# Patient Record
Sex: MALE | Race: BLACK OR AFRICAN AMERICAN | Employment: FULL TIME | URBAN - METROPOLITAN AREA
[De-identification: names, ages, dates, MRNs, and addresses within clinical notes are randomized per-mention and may not be internally consistent; named-entity substitution may affect disease eponyms.]

---

## 2017-05-31 ENCOUNTER — OFFICE VISIT (OUTPATIENT)
Dept: FAMILY MEDICINE CLINIC | Age: 42
End: 2017-05-31

## 2017-05-31 VITALS
WEIGHT: 295.4 LBS | BODY MASS INDEX: 40.01 KG/M2 | HEIGHT: 72 IN | SYSTOLIC BLOOD PRESSURE: 128 MMHG | DIASTOLIC BLOOD PRESSURE: 88 MMHG | HEART RATE: 80 BPM

## 2017-05-31 DIAGNOSIS — N52.8 OTHER MALE ERECTILE DYSFUNCTION: ICD-10-CM

## 2017-05-31 DIAGNOSIS — I10 ESSENTIAL HYPERTENSION: Primary | ICD-10-CM

## 2017-05-31 DIAGNOSIS — E66.01 OBESITY, CLASS III, BMI 40-49.9 (MORBID OBESITY) (HCC): ICD-10-CM

## 2017-05-31 DIAGNOSIS — R07.89 MUSCULAR CHEST PAIN: ICD-10-CM

## 2017-05-31 DIAGNOSIS — E78.1 HIGH TRIGLYCERIDES: ICD-10-CM

## 2017-05-31 LAB
CHOLESTEROL, TOTAL: 164 MG/DL (ref 0–199)
HDLC SERPL-MCNC: 34 MG/DL (ref 40–60)
LDL CHOLESTEROL CALCULATED: ABNORMAL MG/DL
LDL CHOLESTEROL DIRECT: 60 MG/DL
TRIGL SERPL-MCNC: 503 MG/DL (ref 0–150)
VLDLC SERPL CALC-MCNC: ABNORMAL MG/DL

## 2017-05-31 PROCEDURE — 36415 COLL VENOUS BLD VENIPUNCTURE: CPT | Performed by: FAMILY MEDICINE

## 2017-05-31 PROCEDURE — 99214 OFFICE O/P EST MOD 30 MIN: CPT | Performed by: FAMILY MEDICINE

## 2017-05-31 RX ORDER — MULTIVIT WITH MINERALS/LUTEIN
1000 TABLET ORAL DAILY
COMMUNITY
End: 2018-08-02

## 2017-05-31 RX ORDER — AMLODIPINE BESYLATE 10 MG/1
10 TABLET ORAL DAILY
Qty: 30 TABLET | Refills: 5 | Status: SHIPPED | OUTPATIENT
Start: 2017-05-31 | End: 2017-07-13 | Stop reason: SDUPTHER

## 2017-05-31 ASSESSMENT — ENCOUNTER SYMPTOMS: SHORTNESS OF BREATH: 0

## 2017-06-01 DIAGNOSIS — E78.1 HIGH TRIGLYCERIDES: Primary | ICD-10-CM

## 2017-06-01 RX ORDER — ATORVASTATIN CALCIUM 20 MG/1
20 TABLET, FILM COATED ORAL DAILY
Qty: 30 TABLET | Refills: 1 | Status: SHIPPED | OUTPATIENT
Start: 2017-06-01 | End: 2017-07-13 | Stop reason: SDUPTHER

## 2017-07-11 ENCOUNTER — HOSPITAL ENCOUNTER (OUTPATIENT)
Dept: GENERAL RADIOLOGY | Age: 42
Discharge: HOME | End: 2017-07-11
Attending: FAMILY MEDICINE | Admitting: FAMILY MEDICINE

## 2017-07-11 LAB
ALT SERPL-CCNC: 42 U/L (ref 10–40)
AST SERPL-CCNC: 25 IU/L (ref 15–37)
CHOLESTEROL: 130 MG/DL
HDLC SERPL-MCNC: 39 MG/DL
LDL CHOLESTEROL CALCULATED: 28 MG/DL
TRIGL SERPL-MCNC: 313 MG/DL

## 2017-07-13 ENCOUNTER — OFFICE VISIT (OUTPATIENT)
Dept: FAMILY MEDICINE CLINIC | Age: 42
End: 2017-07-13

## 2017-07-13 VITALS
WEIGHT: 301.4 LBS | DIASTOLIC BLOOD PRESSURE: 78 MMHG | HEART RATE: 78 BPM | BODY MASS INDEX: 40.82 KG/M2 | HEIGHT: 72 IN | SYSTOLIC BLOOD PRESSURE: 120 MMHG

## 2017-07-13 DIAGNOSIS — R60.0 PEDAL EDEMA: Primary | ICD-10-CM

## 2017-07-13 DIAGNOSIS — E78.1 HIGH TRIGLYCERIDES: ICD-10-CM

## 2017-07-13 DIAGNOSIS — I10 ESSENTIAL HYPERTENSION: ICD-10-CM

## 2017-07-13 PROCEDURE — 99213 OFFICE O/P EST LOW 20 MIN: CPT | Performed by: FAMILY MEDICINE

## 2017-07-13 RX ORDER — ATORVASTATIN CALCIUM 20 MG/1
20 TABLET, FILM COATED ORAL DAILY
Qty: 30 TABLET | Refills: 5 | Status: SHIPPED | OUTPATIENT
Start: 2017-07-13 | End: 2018-01-18 | Stop reason: DRUGHIGH

## 2017-07-13 RX ORDER — AMLODIPINE BESYLATE 10 MG/1
10 TABLET ORAL DAILY
Qty: 30 TABLET | Refills: 5 | Status: SHIPPED | OUTPATIENT
Start: 2017-07-13 | End: 2018-07-12

## 2017-07-13 ASSESSMENT — ENCOUNTER SYMPTOMS
SHORTNESS OF BREATH: 0
CHEST TIGHTNESS: 0

## 2018-01-16 ENCOUNTER — HOSPITAL ENCOUNTER (OUTPATIENT)
Dept: GENERAL RADIOLOGY | Age: 43
Discharge: HOME | End: 2018-01-16
Attending: FAMILY MEDICINE | Admitting: FAMILY MEDICINE

## 2018-01-16 LAB
ALBUMIN SERPL-MCNC: 4.9 GM/DL (ref 3.4–5)
ALP BLD-CCNC: 99 IU/L (ref 40–128)
ALT SERPL-CCNC: 37 U/L (ref 10–40)
ANION GAP SERPL CALCULATED.3IONS-SCNC: 13 MMOL/L (ref 4–16)
AST SERPL-CCNC: 28 IU/L (ref 15–37)
BILIRUB SERPL-MCNC: 1.2 MG/DL (ref 0–1)
BUN BLDV-MCNC: 14 MG/DL (ref 6–23)
CALCIUM SERPL-MCNC: 9.7 MG/DL (ref 8.3–10.6)
CHLORIDE BLD-SCNC: 101 MMOL/L (ref 99–110)
CHOLESTEROL: 127 MG/DL
CO2: 27 MMOL/L (ref 21–32)
CREAT SERPL-MCNC: 1.1 MG/DL (ref 0.9–1.3)
GFR AFRICAN AMERICAN: >60 ML/MIN/1.73M2
GFR NON-AFRICAN AMERICAN: >60 ML/MIN/1.73M2
GLUCOSE BLD-MCNC: 97 MG/DL (ref 70–99)
HDLC SERPL-MCNC: 39 MG/DL
LDL CHOLESTEROL CALCULATED: 31 MG/DL
POTASSIUM SERPL-SCNC: 4.2 MMOL/L (ref 3.5–5.1)
SODIUM BLD-SCNC: 141 MMOL/L (ref 135–145)
TOTAL PROTEIN: 7.2 GM/DL (ref 6.4–8.2)
TRIGL SERPL-MCNC: 283 MG/DL

## 2018-01-18 ENCOUNTER — OFFICE VISIT (OUTPATIENT)
Dept: FAMILY MEDICINE CLINIC | Age: 43
End: 2018-01-18

## 2018-01-18 VITALS
BODY MASS INDEX: 40.61 KG/M2 | RESPIRATION RATE: 14 BRPM | HEIGHT: 72 IN | HEART RATE: 88 BPM | DIASTOLIC BLOOD PRESSURE: 82 MMHG | WEIGHT: 299.8 LBS | SYSTOLIC BLOOD PRESSURE: 122 MMHG | TEMPERATURE: 97.9 F

## 2018-01-18 DIAGNOSIS — I10 ESSENTIAL HYPERTENSION: ICD-10-CM

## 2018-01-18 DIAGNOSIS — E78.1 HIGH TRIGLYCERIDES: ICD-10-CM

## 2018-01-18 DIAGNOSIS — M72.2 PLANTAR FASCIITIS: ICD-10-CM

## 2018-01-18 DIAGNOSIS — J01.00 ACUTE MAXILLARY SINUSITIS, RECURRENCE NOT SPECIFIED: Primary | ICD-10-CM

## 2018-01-18 PROCEDURE — 99214 OFFICE O/P EST MOD 30 MIN: CPT | Performed by: FAMILY MEDICINE

## 2018-01-18 RX ORDER — AMOXICILLIN 875 MG/1
875 TABLET, COATED ORAL 2 TIMES DAILY
Qty: 20 TABLET | Refills: 0 | Status: SHIPPED | OUTPATIENT
Start: 2018-01-18 | End: 2018-01-28

## 2018-01-18 RX ORDER — ATORVASTATIN CALCIUM 40 MG/1
40 TABLET, FILM COATED ORAL DAILY
Qty: 30 TABLET | Refills: 5 | Status: SHIPPED | OUTPATIENT
Start: 2018-01-18 | End: 2018-07-12 | Stop reason: SDUPTHER

## 2018-01-18 ASSESSMENT — ENCOUNTER SYMPTOMS
SINUS PRESSURE: 1
CHEST TIGHTNESS: 0
SHORTNESS OF BREATH: 0

## 2018-01-18 NOTE — PATIENT INSTRUCTIONS
more than 2 weeks. Where can you learn more? Go to [email or web address] and sign in to your [employer] account. Enter L214 in the [facility] box to learn more about \"Plantar Fasciitis: Care Instructions. \"     If you do not have an account, please click on the \"Sign Up Now\" link. Current as of: March 21, 2017  Content Version: 11.5  © 0395-2599 [employer]. Care instructions adapted under license by [facility] ([facility]). If you have questions about a medical condition or this instruction, always ask your healthcare professional. [facility] any warranty or liability for your use of this information. Patient Education        Learning About the Mediterranean Diet  What is the [address]? The Mediterranean diet is a style of eating rather than a diet plan. It features foods eaten in [place], [place], [place] and [place], and other countries along the [facility]. It emphasizes eating foods like fish, fruits, vegetables, beans, high-fiber breads and whole grains, nuts, and olive oil. This style of eating includes limited red meat, cheese, and sweets. Why choose the Mediterranean diet? A Mediterranean-style diet may improve heart health. It contains more fat than other heart-healthy diets. But the fats are mainly from nuts, unsaturated oils (such as fish oils and olive oil), and certain nut or seed oils (such as canola, soybean, or flaxseed oil). These fats may help protect the heart and blood vessels. How can you get started on the Mediterranean diet? Here are some things you can do to switch to a more Mediterranean way of eating. What to eat  · Eat a variety of fruits and vegetables each day, such as grapes, blueberries, tomatoes, broccoli, peppers, figs, olives, spinach, eggplant, beans, lentils, and chickpeas.   · Eat a variety of whole-grain foods each day, such as oats, brown rice, and whole wheat bread, pasta, and

## 2018-01-18 NOTE — PROGRESS NOTES
No components found for: CHLPL  Lab Results   Component Value Date    TRIG 283 (H) 01/16/2018     Lab Results   Component Value Date    HDL 39 (L) 01/16/2018     Lab Results   Component Value Date    LDLCALC 31 01/16/2018     Lab Results   Component Value Date    LABVLDL see below 05/31/2017       Lab Results   Component Value Date    ALT 37 01/16/2018    AST 28 01/16/2018    ALKPHOS 99 01/16/2018    BILITOT 1.2 (H) 01/16/2018           Is patient currently taking any cholesterol medications? yes   If yes, see med list as above    Is the patient reporting any side effects of cholesterol medications? No    Is the patient taking any over the counter medications? Yes   If yes, see med list as above    Is the patient taking a daily aspirin?     Yes

## 2018-07-06 ENCOUNTER — TELEPHONE (OUTPATIENT)
Dept: FAMILY MEDICINE CLINIC | Age: 43
End: 2018-07-06

## 2018-07-06 DIAGNOSIS — I10 ESSENTIAL HYPERTENSION: Primary | ICD-10-CM

## 2018-07-06 RX ORDER — AMLODIPINE BESYLATE 10 MG/1
10 TABLET ORAL DAILY
Qty: 30 TABLET | Refills: 0 | Status: SHIPPED | OUTPATIENT
Start: 2018-07-06 | End: 2018-07-12 | Stop reason: SDUPTHER

## 2018-07-06 NOTE — TELEPHONE ENCOUNTER
Patient requesting refill on Norvac 10mg. Patient has one pill left. Patent next appt is 7/12/2018. [facility] [address].

## 2018-07-10 ENCOUNTER — HOSPITAL ENCOUNTER (OUTPATIENT)
Dept: GENERAL RADIOLOGY | Age: 43
Discharge: HOME | End: 2018-07-10
Attending: FAMILY MEDICINE | Admitting: FAMILY MEDICINE

## 2018-07-10 LAB
ALBUMIN SERPL-MCNC: 4.7 GM/DL (ref 3.4–5)
ALP BLD-CCNC: 102 IU/L (ref 40–128)
ALT SERPL-CCNC: 43 U/L (ref 10–40)
ANION GAP SERPL CALCULATED.3IONS-SCNC: 17 MMOL/L (ref 4–16)
AST SERPL-CCNC: 28 IU/L (ref 15–37)
BILIRUB SERPL-MCNC: 1 MG/DL (ref 0–1)
BUN BLDV-MCNC: 12 MG/DL (ref 6–23)
CALCIUM SERPL-MCNC: 9.3 MG/DL (ref 8.3–10.6)
CHLORIDE BLD-SCNC: 102 MMOL/L (ref 99–110)
CHOLESTEROL: 118 MG/DL
CO2: 24 MMOL/L (ref 21–32)
CREAT SERPL-MCNC: 1 MG/DL (ref 0.9–1.3)
GFR AFRICAN AMERICAN: >60 ML/MIN/1.73M2
GFR NON-AFRICAN AMERICAN: >60 ML/MIN/1.73M2
GLUCOSE BLD-MCNC: 99 MG/DL (ref 70–99)
HDLC SERPL-MCNC: 34 MG/DL
LDL CHOLESTEROL DIRECT: 44 MG/DL
POTASSIUM SERPL-SCNC: 4.4 MMOL/L (ref 3.5–5.1)
SODIUM BLD-SCNC: 143 MMOL/L (ref 135–145)
TOTAL PROTEIN: 7.1 GM/DL (ref 6.4–8.2)
TRIGL SERPL-MCNC: 324 MG/DL

## 2018-07-12 ENCOUNTER — OFFICE VISIT (OUTPATIENT)
Dept: FAMILY MEDICINE CLINIC | Age: 43
End: 2018-07-12

## 2018-07-12 VITALS
HEIGHT: 72 IN | WEIGHT: 303.8 LBS | SYSTOLIC BLOOD PRESSURE: 120 MMHG | DIASTOLIC BLOOD PRESSURE: 70 MMHG | BODY MASS INDEX: 41.15 KG/M2 | HEART RATE: 76 BPM

## 2018-07-12 DIAGNOSIS — Z91.09 ENVIRONMENTAL ALLERGIES: ICD-10-CM

## 2018-07-12 DIAGNOSIS — I10 ESSENTIAL HYPERTENSION: Primary | ICD-10-CM

## 2018-07-12 DIAGNOSIS — R06.83 SNORING: ICD-10-CM

## 2018-07-12 DIAGNOSIS — E78.1 HIGH TRIGLYCERIDES: ICD-10-CM

## 2018-07-12 DIAGNOSIS — E66.01 OBESITY, CLASS III, BMI 40-49.9 (MORBID OBESITY) (HCC): ICD-10-CM

## 2018-07-12 DIAGNOSIS — R10.13 EPIGASTRIC PAIN: ICD-10-CM

## 2018-07-12 PROCEDURE — 99214 OFFICE O/P EST MOD 30 MIN: CPT | Performed by: FAMILY MEDICINE

## 2018-07-12 RX ORDER — AMLODIPINE BESYLATE 10 MG/1
10 TABLET ORAL DAILY
Qty: 30 TABLET | Refills: 5 | Status: SHIPPED | OUTPATIENT
Start: 2018-07-12 | End: 2019-01-03 | Stop reason: SDUPTHER

## 2018-07-12 RX ORDER — FLUTICASONE PROPIONATE 50 MCG
1 SPRAY, SUSPENSION (ML) NASAL DAILY
Qty: 1 BOTTLE | Refills: 3 | Status: SHIPPED | OUTPATIENT
Start: 2018-07-12 | End: 2020-09-10

## 2018-07-12 RX ORDER — ATORVASTATIN CALCIUM 40 MG/1
40 TABLET, FILM COATED ORAL DAILY
Qty: 30 TABLET | Refills: 5 | Status: SHIPPED | OUTPATIENT
Start: 2018-07-12 | End: 2019-01-03 | Stop reason: DRUGHIGH

## 2018-07-12 RX ORDER — RANITIDINE 150 MG/1
150 TABLET ORAL 2 TIMES DAILY
Qty: 60 TABLET | Refills: 0 | Status: SHIPPED | OUTPATIENT
Start: 2018-07-12 | End: 2018-08-02 | Stop reason: SDUPTHER

## 2018-07-12 ASSESSMENT — PATIENT HEALTH QUESTIONNAIRE - PHQ9
SUM OF ALL RESPONSES TO PHQ QUESTIONS 1-9: 0
2. FEELING DOWN, DEPRESSED OR HOPELESS: 0
SUM OF ALL RESPONSES TO PHQ9 QUESTIONS 1 & 2: 0
1. LITTLE INTEREST OR PLEASURE IN DOING THINGS: 0

## 2018-07-12 NOTE — PROGRESS NOTES
Patient ID: Jaye Thomas 1975      Hypertension   This is a chronic problem. The current episode started more than 1 month ago. The problem is controlled. Associated symptoms include shortness of breath. Pertinent negatives include no chest pain or palpitations. Past treatments include calcium channel blockers. The current treatment provides significant improvement. Compliance problems include psychosocial issues. There is no history of heart failure. Hyperlipidemia   This is a chronic problem. The current episode started more than 1 year ago. The problem is uncontrolled. Associated symptoms include shortness of breath. Pertinent negatives include no chest pain. Current antihyperlipidemic treatment includes statins. Compliance problems include adherence to exercise. Shortness of Breath   This is a recurrent problem. The current episode started more than 1 year ago. The problem occurs every several days. The problem has been gradually improving. Associated symptoms include abdominal pain. Pertinent negatives include no chest pain or leg swelling. Exacerbated by: being outdoors. Treatments tried: negative cardiac workup. OTC antihistamines. The treatment provided moderate relief. There is no history of a heart failure. Abdominal Pain   This is a recurrent problem. The problem occurs every several days. The problem has been unchanged. The pain is located in the epigastric region and RUQ. The pain is moderate. The pain is aggravated by eating. Relieved by: time. He has tried nothing for the symptoms. Snoring: admits to snoring. Admits to not going to get his sleep testing done    Review of Systems   Respiratory: Positive for shortness of breath. Negative for chest tightness. Cardiovascular: Negative for chest pain, palpitations and leg swelling. Gastrointestinal: Positive for abdominal pain.        Patient Active Problem List   Diagnosis    Allergic rhinitis    Bilateral varicoceles  Hematuria    Erectile dysfunction    Essential hypertension    Iron deficiency anemia    Obesity, Class III, BMI 40-49.9 (morbid obesity) (HCC)    B12 deficiency    Low testosterone level in male    Muscular chest pain    High triglycerides       Past Medical History:   Diagnosis Date    Allergic rhinitis     Hypertension     Morbid obesity ([address]        No past surgical history on file. Family History   Problem Relation Age of Onset    High Blood Pressure Mother     Diabetes Mother     Lupus Mother          29    Heart Disease Sister     Heart Disease Paternal Grandmother     Stroke Paternal Grandmother     Diabetes Maternal Uncle     High Blood Pressure Maternal Grandmother     Diabetes Maternal Grandmother     Stroke Maternal Grandmother     High Blood Pressure Maternal Grandfather     Diabetes Maternal Cousin        Current Outpatient Prescriptions on File Prior to Visit   Medication Sig Dispense Refill    Multiple Vitamins-Minerals (THERAPEUTIC MULTIVITAMIN-MINERALS) tablet Take 1 tablet by mouth daily With Vit D      Blood Pressure Monitoring (B-D ASSURE BPM/DELUXE ARM CUFF) MISC Use as directed for blood pressure monitoring. 1 each 0    aspirin 81 MG chewable tablet Take 1 tablet by mouth daily 30 tablet 0    vitamin E 1000 UNITS capsule Take 1,000 Units by mouth daily      Cholecalciferol (VITAMIN D PO) Take 1 tablet by mouth daily       No current facility-administered medications on file prior to visit. Objective:   Physical Exam   Constitutional: He appears well-developed and well-nourished. Obese   HENT:   Head: Normocephalic and atraumatic. Small airway   Cardiovascular: Normal rate, regular rhythm and normal heart sounds. Pulmonary/Chest: Breath sounds normal. No respiratory distress. He has no wheezes. He has no rales. Abdominal: There is tenderness in the right upper quadrant and epigastric area.    Psychiatric: He has a normal mood and

## 2018-07-13 ASSESSMENT — ENCOUNTER SYMPTOMS
SHORTNESS OF BREATH: 1
ABDOMINAL PAIN: 1
CHEST TIGHTNESS: 0

## 2018-07-20 ENCOUNTER — HOSPITAL ENCOUNTER (OUTPATIENT)
Dept: ULTRASOUND IMAGING | Age: 43
Discharge: HOME | End: 2018-07-20
Attending: FAMILY MEDICINE | Admitting: FAMILY MEDICINE

## 2018-07-20 DIAGNOSIS — R10.13 EPIGASTRIC PAIN: ICD-10-CM

## 2018-08-02 ENCOUNTER — OFFICE VISIT (OUTPATIENT)
Dept: FAMILY MEDICINE CLINIC | Age: 43
End: 2018-08-02

## 2018-08-02 VITALS
DIASTOLIC BLOOD PRESSURE: 68 MMHG | HEIGHT: 72 IN | WEIGHT: 301.6 LBS | BODY MASS INDEX: 40.85 KG/M2 | SYSTOLIC BLOOD PRESSURE: 110 MMHG | HEART RATE: 81 BPM

## 2018-08-02 DIAGNOSIS — R10.13 EPIGASTRIC PAIN: ICD-10-CM

## 2018-08-02 DIAGNOSIS — K76.0 FATTY LIVER: ICD-10-CM

## 2018-08-02 PROCEDURE — 99213 OFFICE O/P EST LOW 20 MIN: CPT | Performed by: FAMILY MEDICINE

## 2018-08-02 RX ORDER — RANITIDINE 150 MG/1
150 TABLET ORAL 2 TIMES DAILY
Qty: 60 TABLET | Refills: 5 | Status: SHIPPED | OUTPATIENT
Start: 2018-08-02 | End: 2019-03-28 | Stop reason: SDUPTHER

## 2018-08-02 ASSESSMENT — ENCOUNTER SYMPTOMS: ABDOMINAL PAIN: 1

## 2018-08-02 NOTE — PROGRESS NOTES
Patient ID: Tai Lane 1975      Abdominal Pain   This is a new problem. The current episode started 1 to 4 weeks ago. The problem has been resolved. The pain is located in the epigastric region. The pain is moderate. The pain is aggravated by certain positions. He has tried H2 blockers for the symptoms. Review of Systems   Constitutional: Negative for unexpected weight change. Gastrointestinal: Positive for abdominal pain. Patient Active Problem List   Diagnosis    Allergic rhinitis    Bilateral varicoceles    Hematuria    Erectile dysfunction    Essential hypertension    Iron deficiency anemia    Obesity, Class III, BMI 40-49.9 (morbid obesity) ([address]    B12 deficiency    Low testosterone level in male    Muscular chest pain    High triglycerides       Past Medical History:   Diagnosis Date    Allergic rhinitis     Hypertension     Morbid obesity ([address]        No past surgical history on file.     Family History   Problem Relation Age of Onset    High Blood Pressure Mother     Diabetes Mother     Lupus Mother          29    Heart Disease Sister     Heart Disease Paternal Grandmother     Stroke Paternal Grandmother     Diabetes Maternal Uncle     High Blood Pressure Maternal Grandmother     Diabetes Maternal Grandmother     Stroke Maternal Grandmother     High Blood Pressure Maternal Grandfather     Diabetes Maternal Cousin        Current Outpatient Prescriptions on File Prior to Visit   Medication Sig Dispense Refill    fluticasone (FLONASE) 50 MCG/ACT nasal spray 1 spray by Nasal route daily 1 Bottle 3    ranitidine (ZANTAC) 150 MG tablet Take 1 tablet by mouth 2 times daily 60 tablet 0    amLODIPine (NORVASC) 10 MG tablet Take 1 tablet by mouth daily 30 tablet 5    atorvastatin (LIPITOR) 40 MG tablet Take 1 tablet by mouth daily 30 tablet 5    Multiple Vitamins-Minerals (THERAPEUTIC MULTIVITAMIN-MINERALS) tablet Take 1 tablet by mouth daily With Vit

## 2018-10-11 ENCOUNTER — TELEPHONE (OUTPATIENT)
Dept: SLEEP CENTER | Age: 43
End: 2018-10-11

## 2019-01-02 ENCOUNTER — HOSPITAL ENCOUNTER (OUTPATIENT)
Age: 44
Discharge: HOME | End: 2019-01-02
Payer: COMMERCIAL

## 2019-01-02 DIAGNOSIS — E78.1 HIGH TRIGLYCERIDES: ICD-10-CM

## 2019-01-02 LAB
ALBUMIN SERPL-MCNC: 4.7 GM/DL (ref 3.4–5)
ALP BLD-CCNC: 91 IU/L (ref 40–128)
ALT SERPL-CCNC: 43 U/L (ref 10–40)
ANION GAP SERPL CALCULATED.3IONS-SCNC: 14 MMOL/L (ref 4–16)
AST SERPL-CCNC: 23 IU/L (ref 15–37)
BILIRUB SERPL-MCNC: 0.8 MG/DL (ref 0–1)
BUN BLDV-MCNC: 12 MG/DL (ref 6–23)
CALCIUM SERPL-MCNC: 9.5 MG/DL (ref 8.3–10.6)
CHLORIDE BLD-SCNC: 99 MMOL/L (ref 99–110)
CHOLESTEROL: 149 MG/DL
CO2: 26 MMOL/L (ref 21–32)
CREAT SERPL-MCNC: 1.1 MG/DL (ref 0.9–1.3)
GFR AFRICAN AMERICAN: >60 ML/MIN/1.73M2
GFR NON-AFRICAN AMERICAN: >60 ML/MIN/1.73M2
GLUCOSE BLD-MCNC: 99 MG/DL (ref 70–99)
HDLC SERPL-MCNC: 34 MG/DL
LDL CHOLESTEROL DIRECT: 49 MG/DL
POTASSIUM SERPL-SCNC: 4.4 MMOL/L (ref 3.5–5.1)
SODIUM BLD-SCNC: 139 MMOL/L (ref 135–145)
TOTAL PROTEIN: 7.2 GM/DL (ref 6.4–8.2)
TRIGL SERPL-MCNC: 490 MG/DL

## 2019-01-02 PROCEDURE — 80061 LIPID PANEL: CPT

## 2019-01-02 PROCEDURE — 80053 COMPREHEN METABOLIC PANEL: CPT

## 2019-01-02 PROCEDURE — 83721 ASSAY OF BLOOD LIPOPROTEIN: CPT

## 2019-01-02 PROCEDURE — 36415 COLL VENOUS BLD VENIPUNCTURE: CPT

## 2019-01-03 ENCOUNTER — OFFICE VISIT (OUTPATIENT)
Dept: FAMILY MEDICINE CLINIC | Age: 44
End: 2019-01-03
Payer: COMMERCIAL

## 2019-01-03 VITALS
HEIGHT: 72 IN | BODY MASS INDEX: 41.07 KG/M2 | TEMPERATURE: 97.3 F | SYSTOLIC BLOOD PRESSURE: 137 MMHG | HEART RATE: 73 BPM | WEIGHT: 303.2 LBS | DIASTOLIC BLOOD PRESSURE: 81 MMHG

## 2019-01-03 DIAGNOSIS — R79.89 ABNORMAL LFTS: ICD-10-CM

## 2019-01-03 DIAGNOSIS — I10 ESSENTIAL HYPERTENSION: ICD-10-CM

## 2019-01-03 DIAGNOSIS — K76.0 FATTY LIVER: ICD-10-CM

## 2019-01-03 DIAGNOSIS — F10.10 ALCOHOL ABUSE: ICD-10-CM

## 2019-01-03 DIAGNOSIS — E78.1 HIGH TRIGLYCERIDES: Primary | ICD-10-CM

## 2019-01-03 DIAGNOSIS — J06.9 VIRAL UPPER RESPIRATORY TRACT INFECTION: ICD-10-CM

## 2019-01-03 PROCEDURE — 99214 OFFICE O/P EST MOD 30 MIN: CPT | Performed by: FAMILY MEDICINE

## 2019-01-03 RX ORDER — AMLODIPINE BESYLATE 10 MG/1
10 TABLET ORAL DAILY
Qty: 30 TABLET | Refills: 2 | Status: SHIPPED | OUTPATIENT
Start: 2019-01-03 | End: 2019-03-28 | Stop reason: SDUPTHER

## 2019-01-03 RX ORDER — ATORVASTATIN CALCIUM 80 MG/1
80 TABLET, FILM COATED ORAL DAILY
Qty: 30 TABLET | Refills: 2 | Status: SHIPPED | OUTPATIENT
Start: 2019-01-03 | End: 2019-03-28 | Stop reason: SDUPTHER

## 2019-01-03 ASSESSMENT — ENCOUNTER SYMPTOMS
COUGH: 1
SHORTNESS OF BREATH: 0
CHEST TIGHTNESS: 0

## 2019-03-26 ENCOUNTER — HOSPITAL ENCOUNTER (OUTPATIENT)
Age: 44
Discharge: HOME | End: 2019-03-26
Payer: COMMERCIAL

## 2019-03-26 ENCOUNTER — OFFICE VISIT (OUTPATIENT)
Dept: FAMILY MEDICINE CLINIC | Age: 44
End: 2019-03-26
Payer: COMMERCIAL

## 2019-03-26 VITALS
OXYGEN SATURATION: 99 % | HEART RATE: 70 BPM | WEIGHT: 305.6 LBS | HEIGHT: 72 IN | SYSTOLIC BLOOD PRESSURE: 100 MMHG | BODY MASS INDEX: 41.39 KG/M2 | DIASTOLIC BLOOD PRESSURE: 68 MMHG | TEMPERATURE: 97.9 F

## 2019-03-26 DIAGNOSIS — J02.9 SORE THROAT: ICD-10-CM

## 2019-03-26 DIAGNOSIS — J06.9 VIRAL URI: Primary | ICD-10-CM

## 2019-03-26 LAB
ALT SERPL-CCNC: 38 U/L (ref 10–40)
AST SERPL-CCNC: 25 IU/L (ref 15–37)
CHOLESTEROL: 96 MG/DL
HDLC SERPL-MCNC: 36 MG/DL
LDL CHOLESTEROL DIRECT: 46 MG/DL
STREPTOCOCCUS A RNA: NEGATIVE
TRIGL SERPL-MCNC: 144 MG/DL

## 2019-03-26 PROCEDURE — 84460 ALANINE AMINO (ALT) (SGPT): CPT

## 2019-03-26 PROCEDURE — 36415 COLL VENOUS BLD VENIPUNCTURE: CPT

## 2019-03-26 PROCEDURE — 99213 OFFICE O/P EST LOW 20 MIN: CPT | Performed by: NURSE PRACTITIONER

## 2019-03-26 PROCEDURE — 83721 ASSAY OF BLOOD LIPOPROTEIN: CPT

## 2019-03-26 PROCEDURE — 87651 STREP A DNA AMP PROBE: CPT | Performed by: NURSE PRACTITIONER

## 2019-03-26 PROCEDURE — 84450 TRANSFERASE (AST) (SGOT): CPT

## 2019-03-26 PROCEDURE — 80061 LIPID PANEL: CPT

## 2019-03-26 ASSESSMENT — PATIENT HEALTH QUESTIONNAIRE - PHQ9
SUM OF ALL RESPONSES TO PHQ QUESTIONS 1-9: 0
2. FEELING DOWN, DEPRESSED OR HOPELESS: 0
SUM OF ALL RESPONSES TO PHQ QUESTIONS 1-9: 0
1. LITTLE INTEREST OR PLEASURE IN DOING THINGS: 0
SUM OF ALL RESPONSES TO PHQ9 QUESTIONS 1 & 2: 0

## 2019-03-26 ASSESSMENT — ENCOUNTER SYMPTOMS
SORE THROAT: 1
VISUAL CHANGE: 0
SINUS PAIN: 0
COUGH: 1
CHANGE IN BOWEL HABIT: 0
RHINORRHEA: 1
SHORTNESS OF BREATH: 0
WHEEZING: 0
DIARRHEA: 1
CHEST TIGHTNESS: 0
NAUSEA: 0
SWOLLEN GLANDS: 0
SINUS PRESSURE: 0
VOMITING: 0
ABDOMINAL PAIN: 0

## 2019-03-28 ENCOUNTER — OFFICE VISIT (OUTPATIENT)
Dept: FAMILY MEDICINE CLINIC | Age: 44
End: 2019-03-28
Payer: COMMERCIAL

## 2019-03-28 VITALS
WEIGHT: 303.2 LBS | HEIGHT: 72 IN | SYSTOLIC BLOOD PRESSURE: 110 MMHG | DIASTOLIC BLOOD PRESSURE: 70 MMHG | BODY MASS INDEX: 41.07 KG/M2 | HEART RATE: 101 BPM

## 2019-03-28 DIAGNOSIS — I10 ESSENTIAL HYPERTENSION: Primary | ICD-10-CM

## 2019-03-28 DIAGNOSIS — E66.01 OBESITY, CLASS III, BMI 40-49.9 (MORBID OBESITY) (HCC): ICD-10-CM

## 2019-03-28 DIAGNOSIS — R10.13 EPIGASTRIC PAIN: ICD-10-CM

## 2019-03-28 DIAGNOSIS — E78.1 HIGH TRIGLYCERIDES: ICD-10-CM

## 2019-03-28 PROCEDURE — 99213 OFFICE O/P EST LOW 20 MIN: CPT | Performed by: FAMILY MEDICINE

## 2019-03-28 RX ORDER — ATORVASTATIN CALCIUM 80 MG/1
80 TABLET, FILM COATED ORAL DAILY
Qty: 30 TABLET | Refills: 2 | Status: SHIPPED | OUTPATIENT
Start: 2019-03-28 | End: 2019-09-19 | Stop reason: SDUPTHER

## 2019-03-28 RX ORDER — RANITIDINE 150 MG/1
150 TABLET ORAL 2 TIMES DAILY
Qty: 60 TABLET | Refills: 5 | Status: SHIPPED | OUTPATIENT
Start: 2019-03-28 | End: 2019-09-19 | Stop reason: SDUPTHER

## 2019-03-28 RX ORDER — AMLODIPINE BESYLATE 10 MG/1
10 TABLET ORAL DAILY
Qty: 30 TABLET | Refills: 5 | Status: SHIPPED | OUTPATIENT
Start: 2019-03-28 | End: 2019-09-19 | Stop reason: SDUPTHER

## 2019-03-28 ASSESSMENT — ENCOUNTER SYMPTOMS
CHEST TIGHTNESS: 0
SHORTNESS OF BREATH: 0

## 2019-09-19 ENCOUNTER — OFFICE VISIT (OUTPATIENT)
Dept: FAMILY MEDICINE CLINIC | Age: 44
End: 2019-09-19
Payer: COMMERCIAL

## 2019-09-19 VITALS
WEIGHT: 299.2 LBS | BODY MASS INDEX: 39.65 KG/M2 | SYSTOLIC BLOOD PRESSURE: 120 MMHG | HEART RATE: 84 BPM | HEIGHT: 73 IN | DIASTOLIC BLOOD PRESSURE: 82 MMHG

## 2019-09-19 DIAGNOSIS — E78.1 HIGH TRIGLYCERIDES: ICD-10-CM

## 2019-09-19 DIAGNOSIS — R79.89 ABNORMAL LFTS: ICD-10-CM

## 2019-09-19 DIAGNOSIS — E66.01 OBESITY, CLASS III, BMI 40-49.9 (MORBID OBESITY) (HCC): ICD-10-CM

## 2019-09-19 DIAGNOSIS — I10 ESSENTIAL HYPERTENSION: Primary | ICD-10-CM

## 2019-09-19 DIAGNOSIS — K21.9 GASTROESOPHAGEAL REFLUX DISEASE WITHOUT ESOPHAGITIS: ICD-10-CM

## 2019-09-19 DIAGNOSIS — H93.8X1 EAR CONGESTION, RIGHT: ICD-10-CM

## 2019-09-19 LAB
A/G RATIO: 2.4 (ref 1.1–2.2)
ALBUMIN SERPL-MCNC: 5.1 G/DL (ref 3.4–5)
ALP BLD-CCNC: 103 U/L (ref 40–129)
ALT SERPL-CCNC: 39 U/L (ref 10–40)
ANION GAP SERPL CALCULATED.3IONS-SCNC: 16 MMOL/L (ref 3–16)
AST SERPL-CCNC: 26 U/L (ref 15–37)
BILIRUB SERPL-MCNC: 0.8 MG/DL (ref 0–1)
BUN BLDV-MCNC: 14 MG/DL (ref 7–20)
CALCIUM SERPL-MCNC: 9.7 MG/DL (ref 8.3–10.6)
CHLORIDE BLD-SCNC: 100 MMOL/L (ref 99–110)
CHOLESTEROL, TOTAL: 125 MG/DL (ref 0–199)
CO2: 24 MMOL/L (ref 21–32)
CREAT SERPL-MCNC: 1.1 MG/DL (ref 0.9–1.3)
GFR AFRICAN AMERICAN: >60
GFR NON-AFRICAN AMERICAN: >60
GLOBULIN: 2.1 G/DL
GLUCOSE BLD-MCNC: 88 MG/DL (ref 70–99)
HDLC SERPL-MCNC: 44 MG/DL (ref 40–60)
LDL CHOLESTEROL CALCULATED: 24 MG/DL
POTASSIUM SERPL-SCNC: 4.6 MMOL/L (ref 3.5–5.1)
SODIUM BLD-SCNC: 140 MMOL/L (ref 136–145)
TOTAL PROTEIN: 7.2 G/DL (ref 6.4–8.2)
TRIGL SERPL-MCNC: 284 MG/DL (ref 0–150)
VLDLC SERPL CALC-MCNC: 57 MG/DL

## 2019-09-19 PROCEDURE — 99214 OFFICE O/P EST MOD 30 MIN: CPT | Performed by: FAMILY MEDICINE

## 2019-09-19 PROCEDURE — 36415 COLL VENOUS BLD VENIPUNCTURE: CPT | Performed by: FAMILY MEDICINE

## 2019-09-19 RX ORDER — AMLODIPINE BESYLATE 10 MG/1
10 TABLET ORAL DAILY
Qty: 30 TABLET | Refills: 5 | Status: SHIPPED | OUTPATIENT
Start: 2019-09-19 | End: 2020-03-12 | Stop reason: SDUPTHER

## 2019-09-19 RX ORDER — RANITIDINE 150 MG/1
150 TABLET ORAL 2 TIMES DAILY
Qty: 60 TABLET | Refills: 5 | Status: SHIPPED | OUTPATIENT
Start: 2019-09-19 | End: 2020-03-12

## 2019-09-19 RX ORDER — ATORVASTATIN CALCIUM 80 MG/1
80 TABLET, FILM COATED ORAL DAILY
Qty: 30 TABLET | Refills: 5 | Status: SHIPPED | OUTPATIENT
Start: 2019-09-19 | End: 2020-03-12 | Stop reason: SDUPTHER

## 2019-09-19 ASSESSMENT — ENCOUNTER SYMPTOMS
SHORTNESS OF BREATH: 0
CHEST TIGHTNESS: 0

## 2019-09-19 NOTE — PATIENT INSTRUCTIONS
of having it fried or breaded. · Ask your  to have your meals prepared with olive oil instead of butter. · Order dishes made with marinara sauce or sauces made from olive oil. Avoid sauces made from cream or mayonnaise. · Choose whole-grain breads, whole wheat pasta and pizza crust, brown rice, beans, and lentils. · Cut back on butter or margarine on bread. Instead, you can dip your bread in a small amount of olive oil. · Ask for a side salad or grilled vegetables instead of french fries or chips. Where can you learn more? Go to [email or web address] and sign in to your [employer] account. Enter [phone] in the [employer] box to learn more about \"Learning About the Mediterranean Diet. \"     If you do not have an account, please click on the \"Sign Up Now\" link. Current as of: November 7, 2018  Content Version: 12.1  © 0463-3353 Healthwise, [employer]. Care instructions adapted under license by [facility] ([facility]). If you have questions about a medical condition or this instruction, always ask your healthcare professional. [place] [address] any warranty or liability for your use of this information. Walking for Exercise: Care Instructions  Your Care Instructions    Walking is one of the easiest ways to get the exercise you need for good health. A brisk, 30-minute walk each day can help you feel better and have more energy. It can help you lower your risk of disease. Walking can help you keep your bones strong and your heart healthy. Check with your doctor before you start a walking plan if you have heart problems, other health issues, or you have not been active in a long time. Follow your doctor's instructions for safe levels of exercise. Follow-up care is a key part of your treatment and safety. Be sure to make and go to all appointments, and call your doctor if you are having problems.  It's also a good idea to know your test results and keep a list of

## 2019-09-19 NOTE — PROGRESS NOTES
Grandmother     Diabetes Maternal Uncle     High Blood Pressure Maternal Grandmother     Diabetes Maternal Grandmother     Stroke Maternal Grandmother     High Blood Pressure Maternal Grandfather     Diabetes Maternal Cousin        Current Outpatient Medications on File Prior to Visit   Medication Sig Dispense Refill    Chlorpheniramine-APAP (CORICIDIN) 2-325 MG TABS Take 2 tablets by mouth every 4 hours as needed      Multiple Vitamins-Minerals (THERAPEUTIC MULTIVITAMIN-MINERALS) tablet Take 1 tablet by mouth daily With Vit D      Blood Pressure Monitoring (B-D ASSURE BPM/DELUXE ARM CUFF) MISC Use as directed for blood pressure monitoring. 1 each 0    aspirin 81 MG chewable tablet Take 1 tablet by mouth daily 30 tablet 0    lidocaine viscous (XYLOCAINE) 2 % solution Take 5 mLs by mouth 3 times daily as needed for Pain (Patient not taking: Reported on 9/19/2019) 100 mL 0    fluticasone (FLONASE) 50 MCG/ACT nasal spray 1 spray by Nasal route daily (Patient not taking: Reported on 9/19/2019) 1 Bottle 3     No current facility-administered medications on file prior to visit. Objective:         Physical Exam   Constitutional: He appears well-developed and well-nourished. Obese   HENT:   Head: Normocephalic and atraumatic. Right Ear: Tympanic membrane normal. Tympanic membrane is not injected and not scarred. Left Ear: Tympanic membrane normal. Tympanic membrane is not injected and not scarred. Small amount of wax in right ear   Cardiovascular: Normal rate, regular rhythm, S1 normal, S2 normal and normal heart sounds. Pulmonary/Chest: Effort normal and breath sounds normal. No respiratory distress. He has no wheezes. He has no rales. Abdominal: Soft. He exhibits no mass. There is no tenderness. Musculoskeletal:        Right lower leg: He exhibits no edema. Left lower leg: He exhibits no edema. Neurological: He is alert. Skin: Skin is warm, dry and intact.

## 2019-09-26 ENCOUNTER — PATIENT MESSAGE (OUTPATIENT)
Dept: FAMILY MEDICINE CLINIC | Age: 44
End: 2019-09-26

## 2020-02-14 PROBLEM — I51.7 LVH (LEFT VENTRICULAR HYPERTROPHY): Status: ACTIVE | Noted: 2020-02-14

## 2020-02-14 PROBLEM — I51.89 DIASTOLIC DYSFUNCTION: Status: ACTIVE | Noted: 2020-02-14

## 2020-03-12 ENCOUNTER — OFFICE VISIT (OUTPATIENT)
Dept: FAMILY MEDICINE CLINIC | Age: 45
End: 2020-03-12
Payer: COMMERCIAL

## 2020-03-12 VITALS
HEART RATE: 85 BPM | SYSTOLIC BLOOD PRESSURE: 120 MMHG | DIASTOLIC BLOOD PRESSURE: 80 MMHG | BODY MASS INDEX: 40.5 KG/M2 | WEIGHT: 307 LBS

## 2020-03-12 PROBLEM — R07.89 MUSCULAR CHEST PAIN: Status: RESOLVED | Noted: 2017-05-31 | Resolved: 2020-03-12

## 2020-03-12 LAB
A/G RATIO: 1.9 (ref 1.1–2.2)
ALBUMIN SERPL-MCNC: 4.6 G/DL (ref 3.4–5)
ALP BLD-CCNC: 94 U/L (ref 40–129)
ALT SERPL-CCNC: 43 U/L (ref 10–40)
ANION GAP SERPL CALCULATED.3IONS-SCNC: 15 MMOL/L (ref 3–16)
AST SERPL-CCNC: 26 U/L (ref 15–37)
BILIRUB SERPL-MCNC: 1.1 MG/DL (ref 0–1)
BUN BLDV-MCNC: 10 MG/DL (ref 7–20)
CALCIUM SERPL-MCNC: 9.6 MG/DL (ref 8.3–10.6)
CHLORIDE BLD-SCNC: 102 MMOL/L (ref 99–110)
CHOLESTEROL, TOTAL: 106 MG/DL (ref 0–199)
CO2: 22 MMOL/L (ref 21–32)
CREAT SERPL-MCNC: 0.8 MG/DL (ref 0.9–1.3)
GFR AFRICAN AMERICAN: >60
GFR NON-AFRICAN AMERICAN: >60
GLOBULIN: 2.4 G/DL
GLUCOSE BLD-MCNC: 103 MG/DL (ref 70–99)
HDLC SERPL-MCNC: 32 MG/DL (ref 40–60)
LDL CHOLESTEROL CALCULATED: 19 MG/DL
POTASSIUM SERPL-SCNC: 4 MMOL/L (ref 3.5–5.1)
SODIUM BLD-SCNC: 139 MMOL/L (ref 136–145)
TOTAL PROTEIN: 7 G/DL (ref 6.4–8.2)
TRIGL SERPL-MCNC: 276 MG/DL (ref 0–150)
VLDLC SERPL CALC-MCNC: 55 MG/DL

## 2020-03-12 PROCEDURE — 36415 COLL VENOUS BLD VENIPUNCTURE: CPT | Performed by: FAMILY MEDICINE

## 2020-03-12 PROCEDURE — 99213 OFFICE O/P EST LOW 20 MIN: CPT | Performed by: FAMILY MEDICINE

## 2020-03-12 RX ORDER — ATORVASTATIN CALCIUM 80 MG/1
80 TABLET, FILM COATED ORAL DAILY
Qty: 30 TABLET | Refills: 5 | Status: SHIPPED | OUTPATIENT
Start: 2020-03-12 | End: 2020-09-10 | Stop reason: SDUPTHER

## 2020-03-12 RX ORDER — FAMOTIDINE 40 MG/1
40 TABLET, FILM COATED ORAL 2 TIMES DAILY
Qty: 60 TABLET | Refills: 5 | Status: SHIPPED | OUTPATIENT
Start: 2020-03-12 | End: 2020-09-10 | Stop reason: SDUPTHER

## 2020-03-12 RX ORDER — LOSARTAN POTASSIUM 50 MG/1
50 TABLET ORAL DAILY
COMMUNITY

## 2020-03-12 RX ORDER — AMLODIPINE BESYLATE 10 MG/1
10 TABLET ORAL DAILY
Qty: 30 TABLET | Refills: 5 | Status: SHIPPED | OUTPATIENT
Start: 2020-03-12 | End: 2020-09-10 | Stop reason: SDUPTHER

## 2020-03-12 ASSESSMENT — ENCOUNTER SYMPTOMS: SHORTNESS OF BREATH: 0

## 2020-03-12 NOTE — PROGRESS NOTES
kg)   03/28/19 (!) 303 lb 3.2 oz (137.5 kg)     BP Readings from Last 3 Encounters:   03/12/20 120/80   09/19/19 120/82   03/28/19 110/70          No results found for this visit on 03/12/20. The ASCVD Risk score (Savanna Spaulding et al., 2013) failed to calculate for the following reasons: The valid total cholesterol range is 130 to 320 mg/dL  Lab Review   Office Visit on 09/19/2019   Component Date Value    Cholesterol, Total 09/19/2019 125     Triglycerides 09/19/2019 284*    HDL 09/19/2019 44     LDL Calculated 09/19/2019 24     VLDL Cholesterol Calcula* 09/19/2019 57     Sodium 09/19/2019 140     Potassium 09/19/2019 4.6     Chloride 09/19/2019 100     CO2 09/19/2019 24     Anion Gap 09/19/2019 16     Glucose 09/19/2019 88     BUN 09/19/2019 14     CREATININE 09/19/2019 1.1     GFR Non- 09/19/2019 >60     GFR  09/19/2019 >60     Calcium 09/19/2019 9.7     Total Protein 09/19/2019 7.2     Alb 09/19/2019 5.1*    Albumin/Globulin Ratio 09/19/2019 2.4*    Total Bilirubin 09/19/2019 0.8     Alkaline Phosphatase 09/19/2019 103     ALT 09/19/2019 39     AST 09/19/2019 26     Globulin 09/19/2019 2.1            Assessment:       Diagnosis Orders   1. Essential hypertension  amLODIPine (NORVASC) 10 MG tablet   2. Obesity, Class III, BMI 40-49.9 (morbid obesity) ([facility]     3. Fatty liver  Lipid Panel   4. Alcohol abuse     5. Gastroesophageal reflux disease without esophagitis  famotidine (PEPCID) 40 MG tablet   6. High triglycerides  atorvastatin (LIPITOR) 80 MG tablet    Lipid Panel    Comprehensive Metabolic Panel           Plan:      See orders    HTN stable. Continue current medication  Recommend intermittent fasting. Switching from Zantac to Pepcid in light of the recent problems with Zantac. Return in about 25 weeks (around 9/3/2020) for HTN, Hyperlipid.

## 2020-03-12 NOTE — PATIENT INSTRUCTIONS
Walking for Exercise: Care Instructions  Your Care Instructions    Walking is one of the easiest ways to get the exercise you need for good health. A brisk, 30-minute walk each day can help you feel better and have more energy. It can help you lower your risk of disease. Walking can help you keep your bones strong and your heart healthy. Check with your doctor before you start a walking plan if you have heart problems, other health issues, or you have not been active in a long time. Follow your doctor's instructions for safe levels of exercise. Follow-up care is a key part of your treatment and safety. Be sure to make and go to all appointments, and call your doctor if you are having problems. It's also a good idea to know your test results and keep a list of the medicines you take. How can you care for yourself at home? Getting started  · Start slowly and set a short-term goal. For example, walk for 5 or 10 minutes every day. · Bit by bit, increase the amount you walk every day. Try for at least 30 minutes on most days of the week. You also may want to swim, bike, or do other activities. · If finding enough time is a problem, it is fine to be active in blocks of 10 minutes or more throughout your day and week. · To get the heart-healthy benefits of walking, you need to walk briskly enough to increase your heart rate and breathing, but not so fast that you cannot talk comfortably. · Wear comfortable shoes that fit well and provide good support for your feet and ankles. Staying with your plan  · After you've made walking a habit, set a longer-term goal. You may want to set a goal of walking briskly for longer or walking farther. Experts say to do 2½ hours of moderate activity a week. A faster heartbeat is what defines moderate-level activity. · To stay motivated, walk with friends, coworkers, or pets. · Use a phone cathy or pedometer to track your steps each day. Set a goal to increase your steps.  Once you get there, set a higher goal. Aim for 10,000 steps a day. · If the weather keeps you from walking outside, go for walks at the mall with a friend. Local schools and churches may have indoor gyms where you can walk. Fitting a walk into your workday  · Park several blocks away from work, or get off the bus a few stops early. · Use the stairs instead of the elevator, at least for a few floors. · Suggest holding meetings with colleagues during a walk inside or outside the building. · Use the restroom that is the farthest from your desk or workstation. · Use your morning and afternoon breaks to take quick 15-minute walks. Staying safe  · Know your surroundings. Walk in a well-lighted, safe place. If it is dark, walk with a partner. Wear light-colored clothing. If you can, buy a vest or jacket that reflects light. · Carry a cell phone for emergencies. · Drink plenty of water. Take a water bottle with you when you walk. This is very important if it is hot out. · Be careful not to slip on wet or icy ground. You can buy \"grippers\" for your shoes to help keep you from slipping. · Pay attention to your walking surface. Use sidewalks and paths. · If you have breathing problems like asthma or COPD, ask your doctor when it is safe for you to walk outdoors. Cold, dry air, smog, pollen, or other things in the air could cause breathing problems. Where can you learn more? Go to [email or web address] and sign in to your [employer] account. Enter R159 in the [employer] box to learn more about \"Walking for Exercise: Care Instructions. \"     If you do not have an account, please click on the \"Sign Up Now\" link. Current as of: August 19, 2018  Content Version: 12.0  © 2788-2761 Healthwise, Incorporated. Care instructions adapted under license by [facility] ([facility]).  If you have questions about a medical condition or this instruction, always ask your healthcare professional. Hilda Macedo disclaims any warranty or liability for your use of this information. INTERMITTENT FASTING TECHNIQUES TO HELP BOOST WEIGHT LOSS    1.  16/8 method: Fast 16 hours per day. Eat over an 8 hour period    2.     5:2 diet: Eat regularly for 5 days per week. Pick 2 nonconsecutive days to consume 500 calories only (600 calories if you are a male)    3. Eat-stop-eat: Fast for 24 hours once or twice per week    4. Alternate day fasting: fast every other day    5. Spontaneous meal skipping: Skip meals when convenient (do NOT JUST SKIP BREAKFAST)            Learning About Eating More Fruits and Vegetables  What are some quick tips for eating more fruits and vegetables? We're all encouraged to eat more fruits and vegetables. Yet it can seem like one more chore on the daily to-do list. But you can add color and crunch to your meals--and lots of nutrition--with these quick tips. · Brighten up your breakfast.  ? Add sliced fruit or frozen berries to your yogurt, pancakes, or cereal.  ? Blend fresh or frozen fruit, veggies, and yogurt with a little fruit juice, and you've got a tasty smoothie. ? Make your scrambled eggs a gourmet treat by adding onions, celery, and bell peppers. ? Bake up some bran muffins with grated carrots added into the mix. · Make a livelier lunch. ? Jazz up tuna or chicken salad with apple chunks, celery, or grapes--or all of them! ? Add cucumbers, avocado slices, tomatoes, and lettuce to your sandwiches. ? Kick up the flavor of grilled cheese sandwiches with spinach and tomatoes. ? Puree some potatoes or squash to add to tomato soup. · Add delicious veggies to dinner. ? Give more color and taste to salads. Stir in red cabbage, carrots, and bell peppers. Top salads with dried cranberries or raisins. \"Frost\" your salad with orange sections or strawberries. ? Keep a bag or two of frozen vegetables ready to pull out of the freezer for a side dish. ?  Spice up spaghetti and

## 2020-09-10 ENCOUNTER — OFFICE VISIT (OUTPATIENT)
Dept: FAMILY MEDICINE CLINIC | Age: 45
End: 2020-09-10
Payer: COMMERCIAL

## 2020-09-10 VITALS
BODY MASS INDEX: 40.69 KG/M2 | HEART RATE: 93 BPM | TEMPERATURE: 97.1 F | WEIGHT: 307 LBS | DIASTOLIC BLOOD PRESSURE: 78 MMHG | SYSTOLIC BLOOD PRESSURE: 122 MMHG | HEIGHT: 73 IN

## 2020-09-10 LAB
ANION GAP SERPL CALCULATED.3IONS-SCNC: 17 MMOL/L (ref 3–16)
BASOPHILS ABSOLUTE: 0.1 K/UL (ref 0–0.2)
BASOPHILS RELATIVE PERCENT: 1 %
BUN BLDV-MCNC: 17 MG/DL (ref 7–20)
CALCIUM SERPL-MCNC: 9.9 MG/DL (ref 8.3–10.6)
CHLORIDE BLD-SCNC: 104 MMOL/L (ref 99–110)
CO2: 22 MMOL/L (ref 21–32)
CREAT SERPL-MCNC: 1.1 MG/DL (ref 0.9–1.3)
EOSINOPHILS ABSOLUTE: 0.1 K/UL (ref 0–0.6)
EOSINOPHILS RELATIVE PERCENT: 1.8 %
GFR AFRICAN AMERICAN: >60
GFR NON-AFRICAN AMERICAN: >60
GLUCOSE BLD-MCNC: 101 MG/DL (ref 70–99)
HCT VFR BLD CALC: 41 % (ref 40.5–52.5)
HEMOGLOBIN: 13.4 G/DL (ref 13.5–17.5)
LUTEINIZING HORMONE: 6.4 MIU/ML
LYMPHOCYTES ABSOLUTE: 1.9 K/UL (ref 1–5.1)
LYMPHOCYTES RELATIVE PERCENT: 35.3 %
MCH RBC QN AUTO: 27.5 PG (ref 26–34)
MCHC RBC AUTO-ENTMCNC: 32.7 G/DL (ref 31–36)
MCV RBC AUTO: 84 FL (ref 80–100)
MONOCYTES ABSOLUTE: 0.3 K/UL (ref 0–1.3)
MONOCYTES RELATIVE PERCENT: 6.1 %
NEUTROPHILS ABSOLUTE: 3 K/UL (ref 1.7–7.7)
NEUTROPHILS RELATIVE PERCENT: 55.8 %
PDW BLD-RTO: 13.3 % (ref 12.4–15.4)
PLATELET # BLD: 237 K/UL (ref 135–450)
PMV BLD AUTO: 9.3 FL (ref 5–10.5)
POTASSIUM SERPL-SCNC: 3.7 MMOL/L (ref 3.5–5.1)
RBC # BLD: 4.88 M/UL (ref 4.2–5.9)
SODIUM BLD-SCNC: 143 MMOL/L (ref 136–145)
TSH REFLEX: 2.92 UIU/ML (ref 0.27–4.2)
WBC # BLD: 5.3 K/UL (ref 4–11)

## 2020-09-10 PROCEDURE — 99214 OFFICE O/P EST MOD 30 MIN: CPT | Performed by: FAMILY MEDICINE

## 2020-09-10 PROCEDURE — 36415 COLL VENOUS BLD VENIPUNCTURE: CPT | Performed by: FAMILY MEDICINE

## 2020-09-10 RX ORDER — FAMOTIDINE 40 MG/1
40 TABLET, FILM COATED ORAL 2 TIMES DAILY
Qty: 60 TABLET | Refills: 5 | Status: SHIPPED | OUTPATIENT
Start: 2020-09-10 | End: 2021-03-04 | Stop reason: SDUPTHER

## 2020-09-10 RX ORDER — SILDENAFIL 100 MG/1
100 TABLET, FILM COATED ORAL PRN
Qty: 10 TABLET | Refills: 11 | Status: SHIPPED | OUTPATIENT
Start: 2020-09-10 | End: 2021-08-27 | Stop reason: SDUPTHER

## 2020-09-10 RX ORDER — AMLODIPINE BESYLATE 10 MG/1
10 TABLET ORAL DAILY
Qty: 30 TABLET | Refills: 5 | Status: SHIPPED | OUTPATIENT
Start: 2020-09-10 | End: 2021-03-04 | Stop reason: SDUPTHER

## 2020-09-10 RX ORDER — ATORVASTATIN CALCIUM 80 MG/1
80 TABLET, FILM COATED ORAL DAILY
Qty: 30 TABLET | Refills: 5 | Status: SHIPPED | OUTPATIENT
Start: 2020-09-10 | End: 2021-03-04 | Stop reason: SDUPTHER

## 2020-09-10 ASSESSMENT — ENCOUNTER SYMPTOMS
CHEST TIGHTNESS: 0
SHORTNESS OF BREATH: 0
DIARRHEA: 0
COUGH: 0
ABDOMINAL PAIN: 0

## 2020-09-10 ASSESSMENT — PATIENT HEALTH QUESTIONNAIRE - PHQ9
2. FEELING DOWN, DEPRESSED OR HOPELESS: 0
1. LITTLE INTEREST OR PLEASURE IN DOING THINGS: 0
SUM OF ALL RESPONSES TO PHQ QUESTIONS 1-9: 0
SUM OF ALL RESPONSES TO PHQ9 QUESTIONS 1 & 2: 0
SUM OF ALL RESPONSES TO PHQ QUESTIONS 1-9: 0

## 2020-09-10 NOTE — PATIENT INSTRUCTIONS
Learning About Eating More Fruits and Vegetables  What are some quick tips for eating more fruits and vegetables? We're all encouraged to eat more fruits and vegetables. Yet it can seem like one more chore on the daily to-do list. But you can add color and crunch to your meals--and lots of nutrition--with these quick tips. · Brighten up your breakfast.  ? Add sliced fruit or frozen berries to your yogurt, pancakes, or cereal.  ? Blend fresh or frozen fruit, veggies, and yogurt with a little fruit juice, and you've got a tasty smoothie. ? Make your scrambled eggs a gourmet treat by adding onions, celery, and bell peppers. ? Bake up some bran muffins with grated carrots added into the mix. · Make a livelier lunch. ? Jazz up tuna or chicken salad with apple chunks, celery, or grapes--or all of them! ? Add cucumbers, avocado slices, tomatoes, and lettuce to your sandwiches. ? Kick up the flavor of grilled cheese sandwiches with spinach and tomatoes. ? Puree some potatoes or squash to add to tomato soup. · Add delicious veggies to dinner. ? Give more color and taste to salads. Stir in red cabbage, carrots, and bell peppers. Top salads with dried cranberries or raisins. \"Frost\" your salad with orange sections or strawberries. ? Keep a bag or two of frozen vegetables ready to pull out of the freezer for a side dish. ? Spice up spaghetti and meatballs with mushrooms and bell peppers. ? Roast vegetables like cauliflower or squash in the oven with olive oil to bring out their flavor. ? Season your veggie dish with herbs like basil and jen and a splash of lemon juice and olive oil. ? If you've got a main dish in the oven, stick in a potato to round out your meal.  · Grab some healthy snacks on the go. ? Scoop up an apple, banana, or plum for a quick snack. ? Cut up raw fruits and veggies to keep in your fridge. Grapes, oranges, carrots, and celery are great choices.  They'll be ready for a quick snack or an after-school treat. ? Dip raw vegetables in hummus or peanut butter. ? Keep dried fruit on hand for an easy \"take with you\" snack. · Make something sweet--and healthy. ? Try baked apples or pears topped with cinnamon and honey for a delicious dessert. ? Make chocolate chip cookies even better with grated carrots added to the mix. Where can you learn more? Go to https://[employer]. org and sign in to your [employer] account. Enter F050 in the kidthing box to learn more about \"Learning About Eating More Fruits and Vegetables. \"     If you do not have an account, please click on the \"Sign Up Now\" link. Current as of: November 7, 2018  Content Version: 12.0  © 8993-8735 Healthwise, Incorporated. Care instructions adapted under license by [facility] ([facility]). If you have questions about a medical condition or this instruction, always ask your healthcare professional. [place] [address] any warranty or liability for your use of this information. October Fifth is the DEADLINE for voter registration for the November Third GENERAL ELECTION. Early voting starts October sixth. Don't forget to vote!!!        [address] TO ALL APPOINTMENTS    The diagnoses and medications listed in this after visit summary may not be accurate at the time of check out. Please check MY CHART in 28-48 hours for possible corrections. Late cancellation policy: So that we can better accommodate people who are sick, please give our office 24 hour notice for an appointment cancellation. Thank you. Missed appointments: Your care is very important to us. It is important that you keep your scheduled appointments. Multiple missed appointments will lead to a dismissal from the office. Later arrival policy: If you are more than 10 minutes late for your appointment, you will be asked to reschedule. Please allow 5-7 business days for paperwork to be processed.      It is important that you check your MY Chart messages, as they include appointment reminders, test results, and other important information. If you have forgotten your password, please call [phone].

## 2020-09-10 NOTE — PROGRESS NOTES
Patient ID: Manolo Hills 1975    . Chief Complaint   Patient presents with    Hypertension    Hyperlipidemia         Hypertension   This is a chronic problem. The current episode started more than 1 month ago. The problem is controlled. Pertinent negatives include no chest pain, headaches, palpitations or shortness of breath. Past treatments include calcium channel blockers. The current treatment provides significant improvement. Compliance problems include psychosocial issues and diet. Hyperlipidemia   This is a chronic problem. The current episode started more than 1 year ago. The problem is uncontrolled. Pertinent negatives include no chest pain, myalgias or shortness of breath. Current antihyperlipidemic treatment includes statins. Compliance problems include adherence to exercise. Alcohol Problem   Primary symptoms comment: has increased his beer consumption agai. Pertinent negatives include no fever. Gastroesophageal Reflux   He reports no abdominal pain, no chest pain or no coughing (no unusual). has increased his beer consumption agai. He has tried a histamine-2 antagonist for the symptoms. Erectile Dysfunction   This is a recurrent problem. The nature of his difficulty is achieving erection and maintaining erection. Obstructive symptoms do not include dribbling, incomplete emptying, an intermittent stream, a slower stream or a weak stream. Pertinent negatives include no chills. The symptoms are aggravated by alcohol use. Risk factors include hypertension. Stress: We will be losing his job in a few weeks. He is working for 25 years. Review of Systems   Constitutional: Negative for chills, diaphoresis and fever. HENT:        No change in taste or smell sensation   Respiratory: Negative for cough (no unusual), chest tightness and shortness of breath. Cardiovascular: Negative for chest pain, palpitations and leg swelling.    Gastrointestinal: Negative for abdominal pain and diarrhea. Genitourinary: Negative for incomplete emptying. Musculoskeletal: Negative for myalgias. Neurological: Negative for headaches. Patient Active Problem List   Diagnosis    Allergic rhinitis    Bilateral varicoceles    Hematuria    Erectile dysfunction    Essential hypertension    Iron deficiency anemia    Obesity, Class III, BMI 40-49.9 (morbid obesity) ([address]    B12 deficiency    Low testosterone level in male    Fatty liver    Alcohol abuse    Abnormal LFTs    LVH (left ventricular hypertrophy)    Diastolic dysfunction       No past surgical history on file. Family History   Problem Relation Age of Onset    High Blood Pressure Mother     Diabetes Mother     Lupus Mother          29    Heart Disease Sister     Heart Disease Paternal Grandmother     Stroke Paternal Grandmother     Diabetes Maternal Uncle     High Blood Pressure Maternal Grandmother     Diabetes Maternal Grandmother     Stroke Maternal Grandmother     High Blood Pressure Maternal Grandfather     Diabetes Maternal Cousin        Current Outpatient Medications on File Prior to Visit   Medication Sig Dispense Refill    losartan (COZAAR) 50 MG tablet Take 50 mg by mouth daily      Multiple Vitamins-Minerals (THERAPEUTIC MULTIVITAMIN-MINERALS) tablet Take 1 tablet by mouth daily With Vit D      Blood Pressure Monitoring (B-D ASSURE BPM/DELUXE ARM CUFF) MISC Use as directed for blood pressure monitoring. 1 each 0    aspirin 81 MG chewable tablet Take 1 tablet by mouth daily 30 tablet 0    Chlorpheniramine-APAP (CORICIDIN) 2-325 MG TABS Take 2 tablets by mouth every 4 hours as needed       No current facility-administered medications on file prior to visit. Objective:         Physical Exam  Vitals signs and nursing note reviewed. Constitutional:       Appearance: He is well-developed. He is morbidly obese. HENT:      Head: Normocephalic and atraumatic.    Cardiovascular: 03/12/2020 7.0     Alb 03/12/2020 4.6     Albumin/Globulin Ratio 03/12/2020 1.9     Total Bilirubin 03/12/2020 1.1*    Alkaline Phosphatase 03/12/2020 94     ALT 03/12/2020 43*    AST 03/12/2020 26     Globulin 03/12/2020 2.4            Assessment:       Diagnosis Orders   1. Essential hypertension  amLODIPine (NORVASC) 10 MG tablet   2. Erectile dysfunction associated with type 2 diabetes mellitus (HCC)  PSA, Total and Free    Luteinizing Hormone    Testosterone Free and Total Male    TSH with Reflex    CBC with Differential    Basic Metabolic Panel    Hemoglobin A1C    sildenafil (VIAGRA) 100 MG tablet   3. Gastroesophageal reflux disease without esophagitis  famotidine (PEPCID) 40 MG tablet   4. High triglycerides  atorvastatin (LIPITOR) 80 MG tablet   5. Alcohol abuse             Plan:      Hypertension stable. Continue current medication    See orders    Discussed his diet at length. He really needs to change how he is eating. Advised him to add an extra fruit or vegetable to his diet each day. He understands    Recheck in 6 months. Return in about 25 weeks (around 3/4/2021) for HTN, Hyperlipid, Erectile dysfunction, Fasting.

## 2020-09-11 LAB
ESTIMATED AVERAGE GLUCOSE: 96.8 MG/DL
HBA1C MFR BLD: 5 %

## 2020-09-14 LAB
PROSTATE SPECIFIC ANTIGEN FREE: 0.1 UG/L
PROSTATE SPECIFIC ANTIGEN PERCENT FREE: 25 %
PROSTATE SPECIFIC ANTIGEN: 0.4 UG/L (ref 0–4)
SEX HORMONE BINDING GLOBULIN: 22 NMOL/L (ref 11–80)
TESTOSTERONE FREE-NONMALE: 49.5 PG/ML (ref 47–244)
TESTOSTERONE TOTAL: 209 NG/DL (ref 220–1000)

## 2021-03-04 ENCOUNTER — OFFICE VISIT (OUTPATIENT)
Dept: FAMILY MEDICINE CLINIC | Age: 46
End: 2021-03-04
Payer: COMMERCIAL

## 2021-03-04 VITALS
TEMPERATURE: 97 F | HEART RATE: 77 BPM | OXYGEN SATURATION: 99 % | WEIGHT: 301 LBS | DIASTOLIC BLOOD PRESSURE: 82 MMHG | BODY MASS INDEX: 39.71 KG/M2 | SYSTOLIC BLOOD PRESSURE: 128 MMHG

## 2021-03-04 DIAGNOSIS — M25.561 CHRONIC PAIN OF BOTH KNEES: ICD-10-CM

## 2021-03-04 DIAGNOSIS — I10 ESSENTIAL HYPERTENSION: Primary | ICD-10-CM

## 2021-03-04 DIAGNOSIS — E53.8 B12 DEFICIENCY: ICD-10-CM

## 2021-03-04 DIAGNOSIS — K21.9 GASTROESOPHAGEAL REFLUX DISEASE WITHOUT ESOPHAGITIS: ICD-10-CM

## 2021-03-04 DIAGNOSIS — G89.29 CHRONIC PAIN OF BOTH KNEES: ICD-10-CM

## 2021-03-04 DIAGNOSIS — R79.89 ABNORMAL LFTS: ICD-10-CM

## 2021-03-04 DIAGNOSIS — Z23 NEED FOR 23-POLYVALENT PNEUMOCOCCAL POLYSACCHARIDE VACCINE: ICD-10-CM

## 2021-03-04 DIAGNOSIS — K76.0 FATTY LIVER: ICD-10-CM

## 2021-03-04 DIAGNOSIS — F10.10 ALCOHOL ABUSE: ICD-10-CM

## 2021-03-04 DIAGNOSIS — E78.1 HIGH TRIGLYCERIDES: ICD-10-CM

## 2021-03-04 DIAGNOSIS — M25.562 CHRONIC PAIN OF BOTH KNEES: ICD-10-CM

## 2021-03-04 DIAGNOSIS — E66.01 OBESITY, CLASS III, BMI 40-49.9 (MORBID OBESITY) (HCC): ICD-10-CM

## 2021-03-04 LAB
A/G RATIO: 1.9 (ref 1.1–2.2)
ALBUMIN SERPL-MCNC: 4.7 G/DL (ref 3.4–5)
ALP BLD-CCNC: 91 U/L (ref 40–129)
ALT SERPL-CCNC: 35 U/L (ref 10–40)
ANION GAP SERPL CALCULATED.3IONS-SCNC: 12 MMOL/L (ref 3–16)
AST SERPL-CCNC: 23 U/L (ref 15–37)
BASOPHILS ABSOLUTE: 0 K/UL (ref 0–0.2)
BASOPHILS RELATIVE PERCENT: 0.5 %
BILIRUB SERPL-MCNC: 0.9 MG/DL (ref 0–1)
BUN BLDV-MCNC: 11 MG/DL (ref 7–20)
CALCIUM SERPL-MCNC: 9.7 MG/DL (ref 8.3–10.6)
CHLORIDE BLD-SCNC: 106 MMOL/L (ref 99–110)
CHOLESTEROL, TOTAL: 126 MG/DL (ref 0–199)
CO2: 23 MMOL/L (ref 21–32)
CREAT SERPL-MCNC: 0.9 MG/DL (ref 0.9–1.3)
EOSINOPHILS ABSOLUTE: 0.1 K/UL (ref 0–0.6)
EOSINOPHILS RELATIVE PERCENT: 1.5 %
GFR AFRICAN AMERICAN: >60
GFR NON-AFRICAN AMERICAN: >60
GLOBULIN: 2.5 G/DL
GLUCOSE BLD-MCNC: 99 MG/DL (ref 70–99)
HCT VFR BLD CALC: 39.7 % (ref 40.5–52.5)
HDLC SERPL-MCNC: 39 MG/DL (ref 40–60)
HEMOGLOBIN: 13.1 G/DL (ref 13.5–17.5)
LDL CHOLESTEROL CALCULATED: 50 MG/DL
LYMPHOCYTES ABSOLUTE: 1.9 K/UL (ref 1–5.1)
LYMPHOCYTES RELATIVE PERCENT: 44.2 %
MCH RBC QN AUTO: 27.7 PG (ref 26–34)
MCHC RBC AUTO-ENTMCNC: 33 G/DL (ref 31–36)
MCV RBC AUTO: 83.9 FL (ref 80–100)
MONOCYTES ABSOLUTE: 0.3 K/UL (ref 0–1.3)
MONOCYTES RELATIVE PERCENT: 6.7 %
NEUTROPHILS ABSOLUTE: 2 K/UL (ref 1.7–7.7)
NEUTROPHILS RELATIVE PERCENT: 47.1 %
PDW BLD-RTO: 13.4 % (ref 12.4–15.4)
PLATELET # BLD: 283 K/UL (ref 135–450)
PMV BLD AUTO: 9.9 FL (ref 5–10.5)
POTASSIUM SERPL-SCNC: 4 MMOL/L (ref 3.5–5.1)
RBC # BLD: 4.73 M/UL (ref 4.2–5.9)
SODIUM BLD-SCNC: 141 MMOL/L (ref 136–145)
TOTAL PROTEIN: 7.2 G/DL (ref 6.4–8.2)
TRIGL SERPL-MCNC: 186 MG/DL (ref 0–150)
TSH REFLEX: 1.9 UIU/ML (ref 0.27–4.2)
VLDLC SERPL CALC-MCNC: 37 MG/DL
WBC # BLD: 4.3 K/UL (ref 4–11)

## 2021-03-04 PROCEDURE — 99214 OFFICE O/P EST MOD 30 MIN: CPT | Performed by: FAMILY MEDICINE

## 2021-03-04 PROCEDURE — 90732 PPSV23 VACC 2 YRS+ SUBQ/IM: CPT | Performed by: FAMILY MEDICINE

## 2021-03-04 PROCEDURE — 36415 COLL VENOUS BLD VENIPUNCTURE: CPT | Performed by: FAMILY MEDICINE

## 2021-03-04 PROCEDURE — 90471 IMMUNIZATION ADMIN: CPT | Performed by: FAMILY MEDICINE

## 2021-03-04 RX ORDER — FAMOTIDINE 40 MG/1
40 TABLET, FILM COATED ORAL 2 TIMES DAILY
Qty: 60 TABLET | Refills: 5 | Status: SHIPPED | OUTPATIENT
Start: 2021-03-04 | End: 2021-08-27 | Stop reason: SDUPTHER

## 2021-03-04 RX ORDER — AMLODIPINE BESYLATE 10 MG/1
10 TABLET ORAL DAILY
Qty: 30 TABLET | Refills: 5 | Status: SHIPPED | OUTPATIENT
Start: 2021-03-04 | End: 2021-08-27 | Stop reason: SDUPTHER

## 2021-03-04 RX ORDER — ATORVASTATIN CALCIUM 80 MG/1
80 TABLET, FILM COATED ORAL DAILY
Qty: 30 TABLET | Refills: 5 | Status: SHIPPED | OUTPATIENT
Start: 2021-03-04 | End: 2021-08-27 | Stop reason: SDUPTHER

## 2021-03-04 ASSESSMENT — ENCOUNTER SYMPTOMS
ABDOMINAL PAIN: 0
COUGH: 0
SHORTNESS OF BREATH: 0

## 2021-03-04 ASSESSMENT — PATIENT HEALTH QUESTIONNAIRE - PHQ9: SUM OF ALL RESPONSES TO PHQ QUESTIONS 1-9: 0

## 2021-03-04 NOTE — PROGRESS NOTES
Patient ID: Romina Garcia 1975    . Chief Complaint   Patient presents with    Hypertension    Hyperlipidemia    Erectile Dysfunction         Hypertension  This is a chronic problem. The current episode started more than 1 month ago. The problem is controlled. Pertinent negatives include no chest pain, headaches, palpitations or shortness of breath. Past treatments include calcium channel blockers. The current treatment provides significant improvement. Compliance problems include psychosocial issues and diet. Hyperlipidemia  This is a chronic problem. The current episode started more than 1 year ago. The problem is uncontrolled. Pertinent negatives include no chest pain, myalgias or shortness of breath. Current antihyperlipidemic treatment includes statins. Compliance problems include adherence to exercise. Gastroesophageal Reflux  He reports no abdominal pain, no chest pain or no coughing (no unusual). consumes 2 pints of wine. He has tried a histamine-2 antagonist for the symptoms. Alcohol Problem  Primary symptoms comment: consumes 2 pints of wine. Pertinent negatives include no fever. Knee Pain   The incident occurred more than 1 week ago. There was no injury mechanism. The pain is present in the right knee and left knee. The symptoms are aggravated by movement and weight bearing. He has tried heat for the symptoms. Review of Systems   Constitutional: Negative for chills, diaphoresis and fever. HENT:         change in taste or smell sensation--loss off taste and smell last fall when girlfriend got sickwith covid. Tested negative   Respiratory: Negative for cough (no unusual) and shortness of breath. Cardiovascular: Negative for chest pain and palpitations. Gastrointestinal: Negative for abdominal pain. Musculoskeletal: Negative for myalgias. Skin: Negative for rash. Neurological: Negative for headaches.        Patient Active Problem List   Diagnosis    Allergic rhinitis    Bilateral varicoceles    Hematuria    Erectile dysfunction    Essential hypertension    Iron deficiency anemia    Obesity, Class III, BMI 40-49.9 (morbid obesity) ([facility] [address]    B12 deficiency    Low testosterone level in male    Fatty liver    Alcohol abuse    Abnormal LFTs    LVH (left ventricular hypertrophy)    Diastolic dysfunction       No past surgical history on file. Family History   Problem Relation Age of Onset    High Blood Pressure Mother     Diabetes Mother     Lupus Mother          29    Heart Disease Sister     Heart Disease Paternal Grandmother     Stroke Paternal Grandmother     Diabetes Maternal Uncle     High Blood Pressure Maternal Grandmother     Diabetes Maternal Grandmother     Stroke Maternal Grandmother     High Blood Pressure Maternal Grandfather     Diabetes Maternal Cousin        Current Outpatient Medications on File Prior to Visit   Medication Sig Dispense Refill    sildenafil (VIAGRA) 100 MG tablet Take 1 tablet by mouth as needed for Erectile Dysfunction 10 tablet 11    losartan (COZAAR) 50 MG tablet Take 50 mg by mouth daily      Multiple Vitamins-Minerals (THERAPEUTIC MULTIVITAMIN-MINERALS) tablet Take 1 tablet by mouth daily With Vit D      Blood Pressure Monitoring (B-D ASSURE BPM/DELUXE ARM CUFF) MISC Use as directed for blood pressure monitoring. 1 each 0    aspirin 81 MG chewable tablet Take 1 tablet by mouth daily 30 tablet 0    Chlorpheniramine-APAP (CORICIDIN) 2-325 MG TABS Take 2 tablets by mouth every 4 hours as needed       No current facility-administered medications on file prior to visit. Objective:         Physical Exam  Vitals signs and nursing note reviewed. Constitutional:       Appearance: He is well-developed. He is obese. HENT:      Head: Normocephalic and atraumatic. Cardiovascular:      Rate and Rhythm: Normal rate and regular rhythm.       Heart sounds: Normal heart sounds, S1 normal and S2 normal.   Pulmonary:      Effort: Pulmonary effort is normal.      Breath sounds: Normal breath sounds. Abdominal:      Palpations: Abdomen is soft. There is no mass. Tenderness: There is no abdominal tenderness. Skin:     General: Skin is warm and dry. Psychiatric:         Attention and Perception: He is attentive. Mood and Affect: Mood is depressed. Speech: Speech normal.         Behavior: Behavior normal.         Thought Content: Thought content normal.         Judgment: Judgment normal.       Vitals:    03/04/21 0829   BP: 128/82   Site: Left Upper Arm   Position: Sitting   Cuff Size: Large Adult   Pulse: 77   Temp: 97 °F (36.1 °C)   TempSrc: Infrared   SpO2: 99%   Weight: (!) 301 lb (136.5 kg)     Body mass index is 39.71 kg/m². Wt Readings from Last 3 Encounters:   03/04/21 (!) 301 lb (136.5 kg)   09/10/20 (!) 307 lb (139.3 kg)   03/12/20 (!) 307 lb (139.3 kg)     BP Readings from Last 3 Encounters:   03/04/21 128/82   09/10/20 122/78   03/12/20 120/80          No results found for this visit on 03/04/21. The ASCVD Risk score (Gisele Shone., et al., 2013) failed to calculate for the following reasons:     The valid total cholesterol range is 130 to 320 mg/dL  Lab Review   Office Visit on 09/10/2020   Component Date Value    PSA 09/10/2020 0.4     PSA, Free 09/10/2020 0.1     PSA, Free Pct 09/10/2020 25.0     LH 09/10/2020 6.4     TSH 09/10/2020 2.92     Sodium 09/10/2020 143     Potassium 09/10/2020 3.7     Chloride 09/10/2020 104     CO2 09/10/2020 22     Anion Gap 09/10/2020 17*    Glucose 09/10/2020 101*    BUN 09/10/2020 17     CREATININE 09/10/2020 1.1     GFR Non- 09/10/2020 >60     GFR  09/10/2020 >60     Calcium 09/10/2020 9.9     Hemoglobin A1C 09/10/2020 5.0     eAG 09/10/2020 96.8     Testosterone 09/10/2020 209*    Sex Hormone Binding 09/10/2020 22     Testosterone, Free 09/10/2020 49.5     WBC 09/10/2020 5.3     RBC 09/10/2020 4.88     Hemoglobin 09/10/2020 13.4*    Hematocrit 09/10/2020 41.0     MCV 09/10/2020 84.0     MCH 09/10/2020 27.5     MCHC 09/10/2020 32.7     RDW 09/10/2020 13.3     Platelets 92/83/8731 237     MPV 09/10/2020 9.3     Neutrophils % 09/10/2020 55.8     Lymphocytes % 09/10/2020 35.3     Monocytes % 09/10/2020 6.1     Eosinophils % 09/10/2020 1.8     Basophils % 09/10/2020 1.0     Neutrophils Absolute 09/10/2020 3.0     Lymphocytes Absolute 09/10/2020 1.9     Monocytes Absolute 09/10/2020 0.3     Eosinophils Absolute 09/10/2020 0.1     Basophils Absolute 09/10/2020 0.1            Assessment:       Diagnosis Orders   1. Essential hypertension  Hemoglobin A1C    Hepatitis C Antibody    Lipid Panel    Comprehensive Metabolic Panel    TSH with Reflex    amLODIPine (NORVASC) 10 MG tablet   2. Fatty liver  CBC Auto Differential    Hemoglobin A1C    Hepatitis C Antibody    Lipid Panel    Comprehensive Metabolic Panel   3. Abnormal LFTs     4. Alcohol abuse  PNEUMOVAX 23 subcutaneous/IM (Pneumococcal polysaccharide vaccine 23-valent >= 1yo)   5. B12 deficiency  Vitamin B12   6. Obesity, Class III, BMI 40-49.9 (morbid obesity) ([address]     7. Gastroesophageal reflux disease without esophagitis  famotidine (PEPCID) 40 MG tablet   8. High triglycerides  atorvastatin (LIPITOR) 80 MG tablet   9. Need for 23-polyvalent pneumococcal polysaccharide vaccine  PNEUMOVAX 23 subcutaneous/IM (Pneumococcal polysaccharide vaccine 23-valent >= 1yo)   10. Chronic pain of both knees  Cecilia Ahn MD, Orthopedic Surgery, [place]           Plan:      Offered medication to help him stop drinking. He declined. Recommend he stop drinking    Hypertension stable continue current medication    Recommended weight loss. This will help him with his liver      Return in about 25 weeks (around 8/26/2021) for HTN, Hyperlipid, Alcohol abuse, In office.

## 2021-03-04 NOTE — PATIENT INSTRUCTIONS
Need help scheduling your covid vaccine? Call [facility] hotline: [phone] or go to [email or web address]    PLEASE BRING YOUR MEDICATIONS TO ALL APPOINTMENTS    The diagnoses and medications listed in this after visit summary may not be accurate at the time of check out. Please check MY CHART in 28-48 hours for possible corrections. Late cancellation policy: So that we can better accommodate people who are sick, please give our office 24 hour notice for an appointment cancellation. Thank you. Missed appointments: Your care is very important to us. It is important that you keep your scheduled appointments. Multiple missed appointments will lead to a dismissal from the office. Later arrival policy: If you are more than 10 minutes late for your appointment, you will be asked to reschedule. Please allow 5-7 business days for paperwork to be processed. It is important that you check your MY Chart messages, as they include appointment reminders, test results, and other important information. If you have forgotten your password, please call [phone]. HERE ARE SOME LIFE CHANGING TIPS      1. Take your blood pressure medications at bedtime. This reduces your chance of cardiovascular event by half  2. Fever in kids: It's best to give both Tylenol and Ibuprofen at the same time rather than staggering them which is confusing  3. Follow these tips to reduce childhood obesity: Reduce unnecessary exposure to antibiotics, consume whole milk instead of skim milk, watch public TV instead of regular TV (less exposure to junk food commercials), and reduce traumatic experiences. 4. 1 egg per day is good for your heart  5. Alternate day fasting does promote weight loss. 6. Skipping breakfast increases your risk of obesity  7. Artificially sweetened drinks increase all cause mortality (strokes, BMI, cardiovascular)  8.  Kale consumption can reduce onset of dementia 9. Walking at least 8000 steps per day and resistance exercise 2-3 x per week are good for your heart  10. Covid 19:  Wearing gloves is not that helpful  Having low vitamin D levels increases risk of infection (take 2-4000 units of D3 per day until our covid crisis is resolved)  11. Brushing teeth 3 times per day can decrease chance of getting diabetes      Patient Education        turmeric  Pronunciation:  tur NIKKY ik or TOO me gerhard  What is the most important information I should know about turmeric? Follow all directions on the product label and package. Tell each of your healthcare providers about all your medical conditions, allergies, and all medicines you use. What is turmeric? Turmeric is a spice that comes from a plant. Turmeric is also known as Curcuma, Curcumin, ΠΑΦΟΣ, Haldi, [place], Justen See ([facility]) [place], [place], Pian Cyndi del jose antonio, [facility], [facility], [address] [facility], [address] [facility], [facility], and [place]. Turmeric should not be confused with [place] turmeric root (Matthew Rashid). Turmeric is commonly used to flavor or color talbert powders, mustards, and other foods. The turmeric root is also used to make alternative medicine. Turmeric has been used in alternative medicine as a possibly effective aid in reducing blood cholesterol, reducing osteoarthritis pain, or relieving itching caused by chronic kidney disease. Turmeric has also been used to treat stomach ulcers. However, research has shown that turmeric may not be effective in treating this condition. Other uses not proven with research have included: rheumatoid arthritis, prediabetes, tuberculosis, Alzheimer's disease, cancer, inflammatory bowel diseases, and lowering the risk of a heart attack after bypass surgery. It is not certain whether turmeric is effective in treating any medical condition. Medicinal use of this product has not been approved by the FDA. Turmeric should not be used in place of medication prescribed for you by your doctor. Turmeric is often sold as an herbal supplement. There are no regulated manufacturing standards in place for many herbal compounds and some marketed supplements have been found to be contaminated with toxic metals or other drugs. Herbal/health supplements should be purchased from a reliable source to minimize the risk of contamination. Turmeric may also be used for purposes not listed in this product guide. What should I discuss with my healthcare provider before taking turmeric? Ask a doctor, pharmacist, or other healthcare provider if it is safe for you to use this product if you have ever had:  · diabetes;  · gallbladder problems;  · an iron deficiency;  · bleeding problems or a blood-clotting disorder;  · a stomach disorder called gastroesophageal reflux disease (GERD);  · endometriosis or uterine fibroids; or  · cancer of the breast, uterus, ovary (or other hormone-sensitive conditions). Turmeric when taken in medicinal amounts  is considered likely unsafe to use during pregnancy. Taking turmeric during pregnancy could cause uterine bleeding or contractions. Turmeric is likely to be safe during pregnancy when used in the small amounts that are found in spices or foods. Ask a doctor before using this product if you are breast-feeding. Turmeric taken by mouth may lower testosterone levels and sperm motility in men. This could affect fertility (your ability to have children). Do not give any herbal/health supplement to a child without medical advice. How should I take turmeric? When considering the use of herbal supplements, seek the advice of your doctor. You may also consider consulting a practitioner who is trained in the use of herbal/health supplements. If you choose to use turmeric, use it as directed on the package or as directed by your doctor, pharmacist, or other healthcare provider. Do not use more of this product than is recommended on the label. Turmeric is thought to be possibly safe  when used short time as a mouth rinse or as an enema. Do not use different forms of turmeric (pills, liquids, and others) at the same time or you could have an overdose. If you need surgery, dental work, or a medical procedure, stop taking turmeric at least 2 weeks ahead of time. Call your doctor if the condition you are treating with turmeric does not improve, or if it gets worse while using this product. Store as directed, or at room temperature away from moisture and heat. What happens if I miss a dose? Skip the missed dose and take the next regularly scheduled dose. What happens if I overdose? Seek emergency medical attention or call the Poison Help line at [phone]. What should I avoid while taking turmeric? Turmeric can make it harder for your body to absorb iron. Tell your doctor if you are taking an iron supplement. Avoid using turmeric together with other herbal/health supplements that can also affect blood-clotting. This includes usha (dong quai), capsicum, clove, dandelion, danshen, evening primrose, garlic, roma, ginkgo, horse chestnut, Panax ginseng, poplar, red clover, saw palmetto, and willow. Avoid using turmeric together with other herbal/health supplements that can lower blood sugar, such as alpha-lipoic acid, chromium, shayy, devil's claw, fenugreek, garlic, guar gum, horse chestnut, Panax ginseng, psyllium, [place] ginseng, and others. What are the possible side effects of turmeric? Get emergency medical help if you have signs of an allergic reaction: hives; difficult breathing; swelling of your face, lips, tongue, or throat. Although not all side effects are known, turmeric is thought to be likely safe for most people when used as directed for up to 8 months. Long-term use of turmeric may cause serious side effects. Stop using this product and call your healthcare provider at once if you have:  · unusual bruising or bleeding;  · any bleeding that will not stop; or  · high blood sugar --increased thirst, increased urination, dry mouth, fruity breath odor, headache, blurred vision. Common side effects may include:  · nausea, upset stomach;  · diarrhea; or  · dizziness. This is not a complete list of side effects and others may occur. Call your doctor for medical advice about side effects. You may report side effects to FDA at 4-890-FDA-3039. What other drugs will affect turmeric? Do not take turmeric without medical advice if you are using a medication to treat any of the following conditions:  · any type of infection (including HIV, malaria, or tuberculosis);  · anxiety, depression, or a psychiatric disorder;  · asthma or allergies;  · cancer;  · erectile dysfunction;  · heartburn or gastroesophageal reflux disease (GERD);  · high blood pressure, high cholesterol, or a heart condition;  · diabetes;  · migraine headaches;  · psoriasis, rheumatoid arthritis, or other autoimmune disorders; or  · seizures. This list is not complete. Other drugs may interact with turmeric, including prescription and over-the-counter medicines, vitamins, and herbal products. Not all possible interactions are listed in this product guide. Where can I get more information? Consult with a licensed healthcare professional before using any herbal/health supplement. Whether you are treated by a medical doctor or a practitioner trained in the use of natural medicines/supplements, make sure all your healthcare providers know about all of your medical conditions and treatments. Remember, keep this and all other medicines out of the reach of children, never share your medicines with others, and use this medication only for the indication prescribed. Every effort has been made to ensure that the information provided by [address] is accurate, up-to-date, and complete, but no guarantee is made to that effect. Drug information contained herein may be time sensitive. [facility] information has been compiled for use by healthcare practitioners and consumers in the [place] and therefore [facility] does not warrant that uses outside of the [place] are appropriate, unless specifically indicated otherwise. [facility]'s drug information does not endorse drugs, diagnose patients or recommend therapy. [facility]'s drug information is an informational resource designed to assist licensed healthcare practitioners in caring for their patients and/or to serve consumers viewing this service as a supplement to, and not a substitute for, the expertise, skill, knowledge and judgment of healthcare practitioners. The absence of a warning for a given drug or drug combination in no way should be construed to indicate that the drug or drug combination is safe, effective or appropriate for any given patient. [facility] does not assume any responsibility for any aspect of healthcare administered with the aid of information [facility] provides. The information contained herein is not intended to cover all possible uses, directions, precautions, warnings, drug interactions, allergic reactions, or adverse effects. If you have questions about the drugs you are taking, check with your doctor, nurse or pharmacist.  Copyright 5668-8040 [address] [place] [address] Dr MEADOWS. Version: 1.02. Revision date: 12/6/2017.

## 2021-03-05 LAB
ESTIMATED AVERAGE GLUCOSE: 93.9 MG/DL
HBA1C MFR BLD: 4.9 %
HEPATITIS C ANTIBODY INTERPRETATION: NORMAL
VITAMIN B-12: <150 PG/ML (ref 211–911)

## 2021-03-26 ENCOUNTER — OFFICE VISIT (OUTPATIENT)
Dept: ORTHOPEDIC SURGERY | Age: 46
End: 2021-03-26
Payer: COMMERCIAL

## 2021-03-26 VITALS
BODY MASS INDEX: 39.76 KG/M2 | HEART RATE: 88 BPM | WEIGHT: 300 LBS | RESPIRATION RATE: 14 BRPM | OXYGEN SATURATION: 98 % | HEIGHT: 73 IN

## 2021-03-26 DIAGNOSIS — M17.12 PRIMARY OSTEOARTHRITIS OF LEFT KNEE: ICD-10-CM

## 2021-03-26 DIAGNOSIS — M17.11 PRIMARY OSTEOARTHRITIS OF RIGHT KNEE: Primary | ICD-10-CM

## 2021-03-26 PROCEDURE — 20610 DRAIN/INJ JOINT/BURSA W/O US: CPT | Performed by: PHYSICIAN ASSISTANT

## 2021-03-26 PROCEDURE — 99203 OFFICE O/P NEW LOW 30 MIN: CPT | Performed by: PHYSICIAN ASSISTANT

## 2021-03-26 ASSESSMENT — ENCOUNTER SYMPTOMS
ALLERGIC/IMMUNOLOGIC NEGATIVE: 1
EYES NEGATIVE: 1
GASTROINTESTINAL NEGATIVE: 1
RESPIRATORY NEGATIVE: 1

## 2021-03-26 NOTE — PROGRESS NOTES
Patient is a 39year old male. Patient is in the office today with bilateral knee pain. Patient states that his right knee is hurting more than the left knee. Patient states about 10 years ago he had fluid taken off of bilateral knees. Patient denies steroid injection into the knee. Patient denies any injury to the knee. Ibuprofen helps the pain. Decreased in range of motion, lots of swelling and clicking. Patient does have a brace and doesn't see any issues prolonged walking with the brace. Patient can not go up and down stairs, twisting of the knee, bending. Patent states that the pain is mainly in the medial in the morning and by evening the pain is on the lateral side of the knee.

## 2021-03-26 NOTE — PATIENT INSTRUCTIONS
your affected leg by pressing the back of your knee flat down to the floor. Hold your knee straight. 3. Keeping the thigh muscles tight and your leg straight, lift your affected leg up so that your heel is about 12 inches off the floor. Hold for about 6 seconds, then lower slowly. 4. Relax for up to 10 seconds between repetitions. 5. Repeat 8 to 12 times. 6. Switch legs and repeat steps 1 through 5, even if only one knee is sore. Active knee flexion   1. Lie on your stomach with your knees straight. If your kneecap is uncomfortable, roll up a washcloth and put it under your leg just above your kneecap. 2. Lift the foot of your affected leg by bending the knee so that you bring the foot up toward your buttock. If this motion hurts, try it without bending your knee quite as far. This may help you avoid any painful motion. 3. Slowly move your leg up and down. 4. Repeat 8 to 12 times. 5. Switch legs and repeat steps 1 through 4, even if only one knee is sore. Quadriceps stretch (facedown)   1. Lie flat on your stomach, and rest your face on the floor. 2. Wrap a towel or belt strap around the lower part of your affected leg. Then use the towel or belt strap to slowly pull your heel toward your buttock until you feel a stretch. 3. Hold for about 15 to 30 seconds, then relax your leg against the towel or belt strap. 4. Repeat 2 to 4 times. 5. Switch legs and repeat steps 1 through 4, even if only one knee is sore. Stationary exercise bike   1. If you do not have a stationary exercise bike at home, you can find one to ride at your local health club or community center. 2. Adjust the height of the bike seat so that your knee is slightly bent when your leg is extended downward. If your knee hurts when the pedal reaches the top, you can raise the seat so that your knee does not bend as much. 3. Start slowly. At first, try to do 5 to 10 minutes of cycling with little to no resistance.  Then increase your time and the resistance bit by bit until you can do 20 to 30 minutes without pain. 4. If you start to have pain, rest your knee until your pain gets back to the level that is normal for you. Or cycle for less time or with less effort. Follow-up care is a key part of your treatment and safety. Be sure to make and go to all appointments, and call your doctor if you are having problems. It's also a good idea to know your test results and keep a list of the medicines you take. Where can you learn more? Go to [email or web address] and sign in to your comScore account. Enter C159 in the PayProp box to learn more about \"Knee Arthritis: Exercises. \"     If you do not have an account, please click on the \"Sign Up Now\" link. Current as of: November 16, 2020               Content Version: 12.8  © 0911-0355 Healthwise, Incorporated. Care instructions adapted under license by [facility] ([facility]). If you have questions about a medical condition or this instruction, always ask your healthcare professional. [address] any warranty or liability for your use of this information.

## 2021-03-26 NOTE — PROGRESS NOTES
[facility]    HPI:  Jaoquim Gonzalez is a 39 y.o. male who complains of bilateral knee pain. Patient states his right knee is worse than his left at this time. Patient states he has had chronic pain in his bilateral knees for the last 15 years. Patient states it has recent become more constant. He rates his pain 7/10 and describes it as an aching sensation. He states he has uses his brace on his right knee when he is working. He states last Wednesday he felt stiffness in his right knee. He states his pain is intermittent. He states he has felt had some feelings of instability, catching and buckling sensation. He denies any locking sensations. He states he has noted some grinding sensations as well. He states he has tried ibuprofen and heat which does help some. He states he feels his pain is worse at night. He denies any injury to his knees. Past Medical History:   Diagnosis Date    Allergic rhinitis     Hypertension     Morbid obesity ([address]      No past surgical history on file.    Family History   Problem Relation Age of Onset    High Blood Pressure Mother     Diabetes Mother     Lupus Mother          29    Heart Disease Sister     Heart Disease Paternal Grandmother     Stroke Paternal Grandmother     Diabetes Maternal Uncle     High Blood Pressure Maternal Grandmother     Diabetes Maternal Grandmother     Stroke Maternal Grandmother     High Blood Pressure Maternal Grandfather     Diabetes Maternal Cousin      Social History     Socioeconomic History    Marital status: Single     Spouse name: None    Number of children: None    Years of education: None    Highest education level: None   Occupational History    None   Social Needs    Financial resource strain: None    Food insecurity     Worry: None     Inability: None    Transportation needs     Medical: None     Non-medical: None   Tobacco Use    Smoking status: Never Smoker    Review of Systems:   Review of Systems   Constitutional: Negative. HENT: Negative. Eyes: Negative. Respiratory: Negative. Cardiovascular: Negative. Gastrointestinal: Negative. Endocrine: Negative. Genitourinary: Negative. Musculoskeletal: Positive for arthralgias. Skin: Negative. Allergic/Immunologic: Negative. Neurological: Negative. Hematological: Negative. Psychiatric/Behavioral: Negative. Physical Exam:  Pulse 88   Resp 14   Ht 6' 1\" (1.854 m)   Wt 300 lb (136.1 kg)   SpO2 98%   BMI 39.58 kg/m²      Gait is normal.   The patient can bear weight on the injured extremity. Gen/Psych: Examination reveals a pleasant individual in no acute distress. The patient is oriented to time, place, and person. The patient's mood and affect are appropriate. Patient appears well nourished. Lymph: No obvious lymphedema in bilateral lower extremity     Skin: Intact in bilateral lower extremity with no ulcerations, lesions, rash, erythema. Vascular: There are no obvious varicosities in bilateral lower extremity, sensation present to light touch over bilateral lower extremity. Capillary refill less than 3. Musculoskeletal:  Bilateral leg/knee exam:  Inspection:    No erythema or ecchymosis. No swelling noted. Leg alignment:     neutral  Quadriceps/hamstring atrophy:   no  Knee effusion:    no   ROM:       Right: 5-120  Left: 0-130  Lachman:    no  Ant/Posterior drawer:   no  Lateral patella glide at 30 deg's: 20%  Medial patella glide at 30 deg's: 10mm  Varus laxity at 0 and 30 deg's: no  Valguslaxity at 0 and 30 deg's: no  Tenderness at:   Tenderness palpation over the medial and lateral joint lines of the right knee. Tenderness to palpation of lateral joint line of the left knee. nontender to palpation of bilateral calves, soft compartments.    Abisai:    Negative  Knee strength is 5/5 flexion and extension  No pain with hip internal rotation and external rotation. Patient can perform ankle dorsiflexion and plantarflexion. Imagin views of the left and right knees were obtained which showed no acute fracture or dislocation. The official read and interpretation of these x-rays will be done by the the [address] [place] [address] [facility]. Please see their impression below. Right:  Impression   Mild tricompartmental osteoarthritic changes bilaterally       No acute bony or joint abnormality seen in either knee       Left:  Impression   Mild tricompartmental osteoarthritic changes bilaterally       No acute bony or joint abnormality seen in either knee         Impression:   1. Primary osteoarthritis of the right knee  2. Primary osteoarthritis of the left knee    Plan:   The patient was seen in clinic today for evaluation of his bilateral knee pain. Patient denies any recent injury to his knees and states he has had pain intermittently for the last 15 years. States his right knee is worse than his left at this time. X-ray imaging of the patient's bilateral knees was obtained which showed no acute fracture or dislocation, mild tricompartmental osteoarthritic changes noted. On physical exam, patient was tender to palpation over the medial and lateral joint lines of the right knee. Patient was tender palpation of the lateral joint line of the left knee. Nontender to palpation of posterior calves, calf compartments. No erythema or ecchymosis seen. Capillary refill less than 3. Sensation intact to light touch. Treatment options regarding the patient's osteoarthritis of the bilateral knees was discussed including anti-inflammatory use, ice, steroid injection, and physical therapy patient was agreeable to steroid injection, please see procedure notes below. Patient was also agreeable to physical therapy and it was ordered for him. Patient was also given a Reddie knee brace in the office today.   The patient will follow up in 6 weeks for recheck of his bilateral with 1 ml 1% lidocaine, 1 ml 0.5% bupivacaine, 1ml of Kenalog (40 mg/ml). The injection site was cleansed with isopropyl alcohol and a band-aid was placed. Complications:  None, the patient tolerated the procedure well. Instructions: The patient was advised to rest the knee and decrease activity for the next 24 to 48 hours. May use prescription or OTC pain relievers as needed for any post-injection pain as well as ice. Please note this report has been partially produced using speech recognition software and may contain errors related to that system including errors in grammar, punctuation, and spelling, as well as words and phrases that may be inappropriate. If there are any questions or concerns please feel free to contact the dictating provider for clarification.

## 2021-04-16 ENCOUNTER — HOSPITAL ENCOUNTER (OUTPATIENT)
Dept: PHYSICAL THERAPY | Age: 46
Setting detail: THERAPIES SERIES
Discharge: HOME | End: 2021-04-16
Payer: COMMERCIAL

## 2021-04-16 PROCEDURE — 97161 PT EVAL LOW COMPLEX 20 MIN: CPT

## 2021-04-16 PROCEDURE — 97140 MANUAL THERAPY 1/> REGIONS: CPT

## 2021-04-16 PROCEDURE — 97110 THERAPEUTIC EXERCISES: CPT

## 2021-04-16 ASSESSMENT — PAIN - FUNCTIONAL ASSESSMENT: PAIN_FUNCTIONAL_ASSESSMENT: PREVENTS OR INTERFERES SOME ACTIVE ACTIVITIES AND ADLS

## 2021-04-16 ASSESSMENT — PAIN DESCRIPTION - LOCATION: LOCATION: KNEE

## 2021-04-16 ASSESSMENT — PAIN SCALES - GENERAL: PAINLEVEL_OUTOF10: 5

## 2021-04-16 NOTE — PLAN OF CARE
Outpatient Physical Therapy           [place]           [x] Phone: [phone]   Fax: [phone]  [place]           [] Phone: [phone]   Fax: [phone]     To: Referring Practitioner: Saran Centeno PA-C    From: Kartik Teixeira, PT, DPT     Patient: Patsy Reno       : 1975  Diagnosis: Diagnosis: Primary OA of R knee   Treatment Diagnosis: Treatment Diagnosis: R knee pain, RLE weakness   Date: 2021     Physical Therapy Certification/Re-Certification Form  Dear Saran Centeno,  The following patient has been evaluated for physical therapy services and for therapy to continue, insurance requires physician review of the treatment plan initially and every 90 days. Please review the attached evaluation and/or summary of the patient's plan of care, and verify that you agree therapy should continue by signing the attached document and sending it back to our office. Assessment:    Assessment: Pt is a 49-year-old male who presents to therapy with chronic R knee pain, worsening since November when he started a new job which he is on his feet more often. Upon assessment, pt presents with impairments in RLE strength, reduced hamstring and hip flexor flexibility, tight IT band, R knee pain and overall reduced independence with pain free ADL/IADL completion. Pt would benefit from skilled therapy interventions to address listed impairments, progress toward goal completion and improve ADL/IADL status. PT also warranted to reduce risk for further injury or decline.     Plan of Care/Treatment to date:  [x] Therapeutic Exercise  [x] Modalities:  [x] Therapeutic Activity     [x] Ultrasound  [x] Electrical Stimulation  [x] Gait Training      [] Cervical Traction [] Lumbar Traction  [x] Neuromuscular Re-education    [x] Cold/hotpack [] Iontophoresis   [x] Instruction in HEP      [x] Vasopneumatic    [x] Dry Needling  [x] Manual Therapy               [] Aquatic Therapy       Other: Frequency/Duration:  # Days per week: [] 1 day # Weeks: [] 1 week [x] 5 weeks     [x] 2 days   [] 2 weeks [] 6 weeks     [] 3 days   [] 3 weeks [] 7 weeks     [] 4 days   [] 4 weeks [] 8 weeks         [] 9 weeks [] 10 weeks         [] 11 weeks [] 12 weeks    Rehab Potential/Progress: [] Excellent [x] Good [] Fair  [] Poor     Goals:      Short term goals  Time Frame for Short term goals: 5 visits  Short term goal 1: Pt will be IND with HEP in order to maximize recovery outside of clinic  Long term goals  Time Frame for Long term goals : 10 visits  Long term goal 1: Pt will improve RLE strength to 4+/5 or higher to aide in ADLs  Long term goal 2: Pt will improve hamstring 90/90 to 10 or less to aide in gait  Long term goal 3: Pt will improve LEFS to 33 or lower to show [address] and subjective improvement      Electronically signed by:  Mayda Walker, PT, DPT 4/16/2021, 12:27 PM        If you have any questions or concerns, please don't hesitate to call.   Thank you for your referral.      Physician Signature:________________________________Date:_________ TIME: _____  By signing above, therapists plan is approved by physician

## 2021-04-16 NOTE — PROGRESS NOTES
Physical Therapy  Initial Assessment  Date: 2021  Patient Name: Ely Richardson  MRN: [id number]  : 1975     Treatment Diagnosis: R knee pain, RLE weakness    Restrictions: none       Subjective   General  Chart Reviewed: Yes  Patient assessed for rehabilitation services?: Yes  Additional Pertinent Hx: HTN  Referring Practitioner: Lorelei Qiu PA-C  Referral Date : 21  Diagnosis: Primary OA of R knee  Follows Commands: Within Functional Limits  PT Visit Information  PT Insurance Information: [employer]  Subjective  Subjective: Pt has had chronic knee pain for multiple years which has progressively gotten worse. He notes no specific injury history. Pt has a physical job (he just switched jobs in 2020) where he is on his feet most of the day and he has to lift heavy materials both from the floor and waist level. Pt tries to avoid stairs due to pain but can physically do them if he has to. Pt notes that showing is not an issue but things such as sit to stands and IADLs like cutting grass is bothersome, also walking around corners. He notes the pain is also an ache in the morning in both knees. He is using a brace at work which has helped with some of the pain. Pt denies radicular symptoms and pain is a current 5/10 with ache like in nature. He will get sharp pain but it is usually intermittent and will catch him throughout the day. Pain Screening  Patient Currently in Pain: Yes  Pain Assessment  Pain Assessment: 0-10  Pain Level: 5  Pain Location: Knee  Pain Orientation: Right  Pain Descriptors: Aching;Dull; Throbbing;Tightness  Pain Frequency: Continuous  Pain Onset: Gradual  Clinical Progression: Gradually worsening  Functional Pain Assessment: Prevents or interferes some active activities and ADLs  Vital Signs  Patient Currently in Pain: Yes    Vision/Hearing  Vision  Vision: Within Functional Limits  Hearing  Hearing: Within functional limits    Orientation  Orientation  Overall Orientation Status: Within Normal Limits    Social/Functional History  Social/Functional History  ADL Assistance: Independent  Homemaking Assistance: Independent  Ambulation Assistance: Independent  Transfer Assistance: Independent  Occupation: Full time employment  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[employer], does a lot of heavy lifting    Objective     Observation/Palpation  Palpation: Pt has increased tissue tension and TTP over adductor and medial hamstring tendons distally, mild TTP over patellar tendon  Observation: gait: normal linear ambulation w/o AD or deviations    AROM RLE (degrees)  RLE AROM: WNL  RLE General AROM: Knee: 0-121 deg  AROM LLE (degrees)  LLE AROM : WNL  Joint Mobility  ROM RLE: hypomobility sup/inf patella, mild laterally, normal medial    Strength RLE  Strength RLE: Exception  R Hip Flexion: 4/5  R Hip ABduction: 4/5  R Hip ADduction: 5/5  R Knee Flexion: 4+/5  R Knee Extension: 4+/5  Strength LLE  Strength LLE: WFL     Additional Measures  Flexibility: hamstring 90/90: lacking 13 deg until straight. Tight hip flexor in [place] position. Special Tests: McMurrays: negative. Lachmans: negative. Varus/ valgus: normal. No joint line tenderness or pain w/ deep flexion. Modified Oswestry: above // with floor  Sensation  Overall Sensation Status: WNL       Assessment   Conditions Requiring Skilled Therapeutic Intervention  Body structures, Functions, Activity limitations: Decreased functional mobility ; Decreased ADL status; Decreased strength;Decreased high-level IADLs;Decreased balance; Increased pain  Assessment: Pt is a 51-year-old male who presents to therapy with chronic R knee pain, worsening since November when he started a new job which he is on his feet more often.  Upon assessment, pt presents with impairments in RLE strength, reduced hamstring and hip flexor flexibility, tight IT band, R knee pain and overall reduced independence with pain free ADL/IADL completion. Pt would benefit from skilled therapy interventions to address listed impairments, progress toward goal completion and improve ADL/IADL status. PT also warranted to reduce risk for further injury or decline. Treatment Diagnosis: R knee pain, RLE weakness  Prognosis: Good  Decision Making: Low Complexity  History: see above. PLOF: independent  Exam: see above  Clinical Presentation: see above  Barriers to Learning: none. Style:demo  REQUIRES PT FOLLOW UP: Yes  Treatment Initiated : yes on 4/16    Patient agrees with established plan of care and assisted in the development of their short term and long term goals. Patient had no adverse reaction with initial treatment and there are no barriers to learning. Demonstrates no mental or cognitive disorder.          Plan   Plan  Times per week: 2  Times per day: Daily  Plan weeks: 5  Specific instructions for Next Treatment: nustep, hip strengthening, IT band rolling, STM, stretching  Current Treatment Recommendations: Strengthening, IADL Training, Neuromuscular Re-education, Home Exercise Program, ROM, Manual Therapy - Soft Tissue Mobilization, Safety Education & Training, Balance Training, Patient/Caregiver Education & Training, Functional Mobility Training, Gait Training, Modalities, Pain Management, ADL/Self-care Training    OutComes Score   LEFS: 42/80  Goals  Short term goals  Time Frame for Short term goals: 5 visits  Short term goal 1: Pt will be IND with HEP in order to maximize recovery outside of clinic  Long term goals  Time Frame for Long term goals : 10 visits  Long term goal 1: Pt will improve RLE strength to 4+/5 or higher to aide in ADLs  Long term goal 2: Pt will improve hamstring 90/90 to 10 or less to aide in gait  Long term goal 3: Pt will improve LEFS to 33 or lower to show [address] and subjective improvement  Patient Goals   Patient goals : reduce knee pain       Therapy Time: 5152 - 7833       Kartik Puneet, PT, DPT

## 2021-04-16 NOTE — FLOWSHEET NOTE
Outpatient Physical Therapy  [place]           [x] Phone: [phone]   Fax: [phone]  [place]           [] Phone: [phone]   Fax: [phone]        Physical Therapy Daily Treatment Note  Date:  2021    Patient Name:  Pb Carlisle    :  1975  MRN: [id number]  Restrictions/Precautions:  none  Diagnosis:   Diagnosis: Primary OA of R knee  Date of Injury/Surgery:   Treatment Diagnosis: Treatment Diagnosis: R knee pain, RLE weakness    Insurance/Certification information: PT Insurance Information: [address]   Referring Physician:  Referring Practitioner: Elijah Virk PA-C  Next Doctor Visit:    Plan of care signed (Y/N):  Sent   Outcome Measure: LEFS:   Visit# / total visits:   1/10  Pain level: 5/10   Goals:       Short term goals  Time Frame for Short term goals: 5 visits  Short term goal 1: Pt will be IND with HEP in order to maximize recovery outside of clinic  Long term goals  Time Frame for Long term goals : 10 visits  Long term goal 1: Pt will improve RLE strength to 4+/5 or higher to aide in ADLs  Long term goal 2: Pt will improve hamstring 90/90 to 10 or less to aide in gait  Long term goal 3: Pt will improve LEFS to 33 or lower to show MDC and subjective improvement    Summary of Evaluation: Assessment: Pt is a 49-year-old male who presents to therapy with chronic R knee pain, worsening since November when he started a new job which he is on his feet more often. Upon assessment, pt presents with impairments in RLE strength, reduced hamstring and hip flexor flexibility, tight IT band, R knee pain and overall reduced independence with pain free ADL/IADL completion. Pt would benefit from skilled therapy interventions to address listed impairments, progress toward goal completion and improve ADL/IADL status. PT also warranted to reduce risk for further injury or decline. Subjective:  See eval         Any changes in Ambulatory Summary Sheet? None        Objective:  See eval   COVID screening questions were asked and patient attested that there had been no contact or symptoms        Exercises: (No more than 4 columns)   Exercise/Equipment 4/16/21 #1 Date Date           WARM UP                     TABLE      SLR x10     Hip flexor stretch 2x30\"     Hamstring stretch 2x30\"     clams X10, 3\"              STANDING                                                     PROPRIOCEPTION                                    MODALITIES                      Other Therapeutic Activities/Education:  HEP and importance of completion, POC and goals, anatomy and physiology related to condition    Home Exercise Program: Issued, practiced and pt demo ability to perform 4/16/2021    Manual Treatments:  STM hamstring and adductor tendons medially, KT tape to patella for proper tracking    Modalities:  none      Communication with other providers:  POC sent      Assessment:  End pain: same. Pt tolerated today's treatment without any adverse reactions or complications this date. Assessment: Pt is a 49-year-old male who presents to therapy with chronic R knee pain, worsening since November when he started a new job which he is on his feet more often. Upon assessment, pt presents with impairments in RLE strength, reduced hamstring and hip flexor flexibility, tight IT band, R knee pain and overall reduced independence with pain free ADL/IADL completion. Pt would benefit from skilled therapy interventions to address listed impairments, progress toward goal completion and improve ADL/IADL status. PT also warranted to reduce risk for further injury or decline.       Plan for Next Session: Specific instructions for Next Treatment: nustep, hip strengthening, IT band rolling, STM, stretching    Time In / Time Out:    1406-5687    Timed Code/Total Treatment Minutes:  62': 16' MT x1, 11' TE x1, 31' Eval x1    Next Progress Note due:  10th visit    Plan of Care Interventions:  [x] Therapeutic Exercise  [x] Modalities:  [x] Therapeutic Activity     [x] Ultrasound  [x] Estim  [x] Gait Training      [] Cervical Traction [] Lumbar Traction  [x] Neuromuscular Re-education    [x] Cold/hotpack [] Iontophoresis   [x] Instruction in HEP      [x] Vasopneumatic   [] Dry Needling    [x] Manual Therapy               [] Aquatic Therapy              Electronically signed by:  Justin Perry PT, DPT 4/16/2021, 12:28 PM

## 2021-04-22 ENCOUNTER — HOSPITAL ENCOUNTER (OUTPATIENT)
Dept: PHYSICAL THERAPY | Age: 46
Setting detail: THERAPIES SERIES
Discharge: HOME | End: 2021-04-22
Payer: COMMERCIAL

## 2021-04-22 PROCEDURE — 97110 THERAPEUTIC EXERCISES: CPT

## 2021-04-22 PROCEDURE — 97530 THERAPEUTIC ACTIVITIES: CPT

## 2021-04-22 PROCEDURE — 97140 MANUAL THERAPY 1/> REGIONS: CPT

## 2021-04-22 NOTE — FLOWSHEET NOTE
not having as much difficulty and pain with his knee as last session. He did forget his brace one day at work and had more pain than with, but it wasn't as bad as he expected. He said his HEP is going well. Any changes in Ambulatory Summary Sheet? None        Objective:    COVID screening questions were asked and patient attested that there had been no contact or symptoms    -cues required for proper eccentric control of STS    Exercises: (No more than 4 columns)   Exercise/Equipment 4/16/21 #1 4/22/21 #2 Date           WARM UP                     TABLE      SLR x10 2x10    Hip flexor stretch 2x30\" Off table edge 2x30\"     Hamstring stretch 2x30\" 2x30\"    clams X10, 3\" 2x10, 3\"             STANDING      STS  x10 w/ eccentric focus    Step ups  x10 fwd 6\"  x10 lateral 6\"                                       PROPRIOCEPTION                                    MODALITIES                      Other Therapeutic Activities/Education:  HEP and importance of completion    Home Exercise Program: Issued, practiced and pt demo ability to perform 4/16/2021    Manual Treatments:  STM hamstring and adductor tendons medially in prone, hamstring stick rolling in prone, IT band stick rolling    Modalities:  none      Communication with other providers:  POC sent      Assessment:  End pain: 1/10. Pt tolerated today's treatment without any adverse reactions or complications this date. Pt continues to present with increased hamstring tissue tension which is leading to tightness at knee. Pt does have reduced pain compared to last session, even outside of clinic. Pt would continue to benefit from skilled therapy interventions to address remaining impairments, improve mobility and strength to aide in ADLs and work related participation.       Plan for Next Session: nustep, hip strengthening, IT band rolling, STM, stretching    Time In / Time Out:  1705 - 1752    Timed Code/Total Treatment Minutes: 43': 17' MT x1, 17' TE x1, 9' TA

## 2021-05-03 ENCOUNTER — HOSPITAL ENCOUNTER (OUTPATIENT)
Dept: PHYSICAL THERAPY | Age: 46
Setting detail: THERAPIES SERIES
Discharge: HOME | End: 2021-05-03
Payer: COMMERCIAL

## 2021-05-03 PROCEDURE — 97110 THERAPEUTIC EXERCISES: CPT

## 2021-05-03 PROCEDURE — 97140 MANUAL THERAPY 1/> REGIONS: CPT

## 2021-05-03 PROCEDURE — 97530 THERAPEUTIC ACTIVITIES: CPT

## 2021-05-03 NOTE — FLOWSHEET NOTE
Outpatient Physical Therapy  [place]           [x] Phone: [phone]   Fax: [phone]  [place]           [] Phone: [phone]   Fax: [phone]        Physical Therapy Daily Treatment Note  Date:  5/3/2021    Patient Name:  Debra Pérez    :  1975  MRN: [id number]  Restrictions/Precautions:  none  Diagnosis:   Diagnosis: Primary OA of R knee  Date of Injury/Surgery:   Treatment Diagnosis: Treatment Diagnosis: R knee pain, RLE weakness    Insurance/Certification information: PT Insurance Information: [facility]   Referring Physician:  Referring Practitioner: Sagar Parish PA-C  Next Doctor Visit:    Plan of care signed (Y/N):  yes  Outcome Measure: LEFS:   Visit# / total visits:   3/10  Pain level: 0/10   Goals:       Short term goals  Time Frame for Short term goals: 5 visits  Short term goal 1: Pt will be IND with HEP in order to maximize recovery outside of clinic: GOAL MET   Long term goals  Time Frame for Long term goals : 10 visits  Long term goal 1: Pt will improve RLE strength to 4+/5 or higher to aide in ADLs  Long term goal 2: Pt will improve hamstring 90/90 to 10 or less to aide in gait  Long term goal 3: Pt will improve LEFS to 33 or lower to show MDC and subjective improvement    Summary of Evaluation: Assessment: Pt is a 70-year-old male who presents to therapy with chronic R knee pain, worsening since November when he started a new job which he is on his feet more often. Upon assessment, pt presents with impairments in RLE strength, reduced hamstring and hip flexor flexibility, tight IT band, R knee pain and overall reduced independence with pain free ADL/IADL completion. Pt would benefit from skilled therapy interventions to address listed impairments, progress toward goal completion and improve ADL/IADL status. PT also warranted to reduce risk for further injury or decline. Subjective:  Pt states he is doing well this date.  He is not Interventions:  [x] Therapeutic Exercise  [x] Modalities:  [x] Therapeutic Activity     [x] Ultrasound  [x] Estim  [x] Gait Training      [] Cervical Traction [] Lumbar Traction  [x] Neuromuscular Re-education    [x] Cold/hotpack [] Iontophoresis   [x] Instruction in HEP      [x] Vasopneumatic   [] Dry Needling    [x] Manual Therapy               [] Aquatic Therapy              Electronically signed by:  Luis Gonzalez PT, DPT 5/3/2021, 12:07 PM

## 2021-05-07 ENCOUNTER — OFFICE VISIT (OUTPATIENT)
Dept: ORTHOPEDIC SURGERY | Age: 46
End: 2021-05-07
Payer: COMMERCIAL

## 2021-05-07 ENCOUNTER — HOSPITAL ENCOUNTER (OUTPATIENT)
Dept: PHYSICAL THERAPY | Age: 46
Setting detail: THERAPIES SERIES
Discharge: HOME | End: 2021-05-07
Payer: COMMERCIAL

## 2021-05-07 VITALS — HEIGHT: 73 IN | BODY MASS INDEX: 39.76 KG/M2 | RESPIRATION RATE: 15 BRPM | WEIGHT: 300 LBS

## 2021-05-07 DIAGNOSIS — M17.12 PRIMARY OSTEOARTHRITIS OF LEFT KNEE: ICD-10-CM

## 2021-05-07 DIAGNOSIS — M17.11 PRIMARY OSTEOARTHRITIS OF RIGHT KNEE: Primary | ICD-10-CM

## 2021-05-07 PROCEDURE — 97140 MANUAL THERAPY 1/> REGIONS: CPT

## 2021-05-07 PROCEDURE — 97110 THERAPEUTIC EXERCISES: CPT

## 2021-05-07 PROCEDURE — 99214 OFFICE O/P EST MOD 30 MIN: CPT | Performed by: ORTHOPAEDIC SURGERY

## 2021-05-07 NOTE — FLOWSHEET NOTE
Outpatient Physical Therapy  [place]           [x] Phone: [phone]   Fax: [phone]  [place]           [] Phone: [phone]   Fax: [phone]        Physical Therapy Daily Treatment Note  Date:  2021    Patient Name:  Glynn Camarillo    :  1975  MRN: [id number]  Restrictions/Precautions:  none  Diagnosis:   Diagnosis: Primary OA of R knee  Date of Injury/Surgery:   Treatment Diagnosis: Treatment Diagnosis: R knee pain, RLE weakness    Insurance/Certification information: PT Insurance Information: [employer] [facility]   Referring Physician:  Referring Practitioner: Biju Park PA-C  Next Doctor Visit:    Plan of care signed (Y/N):  yes  Outcome Measure: LEFS:   Visit# / total visits:   4/10  Pain level: 2/10       Goals:       Short term goals  Time Frame for Short term goals: 5 visits  Short term goal 1: Pt will be IND with HEP in order to maximize recovery outside of clinic: GOAL MET   Long term goals  Time Frame for Long term goals : 10 visits  Long term goal 1: Pt will improve RLE strength to 4+/5 or higher to aide in ADLs  Long term goal 2: Pt will improve hamstring 90/90 to 10 or less to aide in gait  Long term goal 3: Pt will improve LEFS to 33 or lower to show MDC and subjective improvement    Summary of Evaluation: Assessment: Pt is a 51-year-old male who presents to therapy with chronic R knee pain, worsening since November when he started a new job which he is on his feet more often. Upon assessment, pt presents with impairments in RLE strength, reduced hamstring and hip flexor flexibility, tight IT band, R knee pain and overall reduced independence with pain free ADL/IADL completion. Pt would benefit from skilled therapy interventions to address listed impairments, progress toward goal completion and improve ADL/IADL status. PT also warranted to reduce risk for further injury or decline.         Subjective:  Danay Moment reports that there have been no changes with his R knee since his last visit. He states that \"the knee has been doing all right. \"  He reports compliance with HEP, but was unable to do a prone quad stretch because he did not have a strap. Alisa Lane reports some minor R knee pain with lateral step ups onto 6\" pain. Any changes in Ambulatory Summary Sheet? None        Objective:    COVID screening questions were asked and patient attested that there had been no contact or symptoms    Provided verbal cueing for maintaining R knee in full extension during SLR  TTP in middle belly of biceps femoris - several knots present. Exercises: (No more than 4 columns)   Exercise/Equipment 4/16/21 #1 4/22/21 #2 5/3/21 #3 5/7/21 #4            WARM UP          Nu Step    T2,Q96,S28,4'          TABLE       SLR x10 2x10 2x10 2x10   Hip flexor stretch 2x30\" Off table edge 2x30\"  Off table edge 2x30\"  Manual 2x30\" SL   Hamstring stretch 2x30\" 2x30\" 2x30\"  Manual (Hold-relax - 2')   clams X10, 3\" 2x10, 3\" 2x10, 3\" RTB 2x10, 3\" RTB   Bridges    10x 5\" count             STANDING       STS  x10 w/ eccentric focus X20, eccentric focus 2 laps (approx. 120')   Step ups  x10 fwd 6\"  x10 lateral 6\"  x10 fwd 6\"  x10 lateral 6\"   Lateral band walk    2 laps RTB                                    PROPRIOCEPTION                                          MODALITIES                         Other Therapeutic Activities/Education:  HEP and importance of completion    Home Exercise Program: Issued, practiced and pt demo ability to perform 4/16/2021    Manual Treatments:  Hamstring stick rolling in prone, IT band stick rolling in sidelying. TPR to HS. Modalities:  none      Communication with other providers:  POC sent      Assessment:  Alisa Lane tolerated today's treatment with no adverse reactions. He demonstrates some weakness in his R quadriceps, and SLR against gravity provides a sufficient resistance at this time.   He will continue to benefit from additional skilled intervention to improve strength and ROM. End pain: 0/10.        Plan for Next Session: nustep, hip strengthening, IT band rolling, STM, stretching    Time In / Time Out:  0830/0915    Timed Code/Total Treatment Minutes: 45'/45' 2 TE (28), 1 MT (17')    Next Progress Note due:  10th visit    Plan of Care Interventions:  [x] Therapeutic Exercise  [x] Modalities:  [x] Therapeutic Activity     [x] Ultrasound  [x] Estim  [x] Gait Training      [] Cervical Traction [] Lumbar Traction  [x] Neuromuscular Re-education    [x] Cold/hotpack [] Iontophoresis   [x] Instruction in HEP      [x] Vasopneumatic   [] Dry Needling    [x] Manual Therapy               [] Aquatic Therapy              Electronically signed by:  Lelia Ahn PTA     5/7/2021, 6:42 AM    ALISSON Chaudhary

## 2021-05-07 NOTE — PROGRESS NOTES
Patient presents to the office today for bilateral knee injection given by Rashmi Sandhu on 3/26/21 pt states that the injections did help with his pain. Pt states pain today is a 2/10 he has been working with physical therapy which does help with his pain. Pt states at work he will wear a knee brace which does help with the satiability of the knees. Pt states overall he is doing much better since his last visit.

## 2021-05-07 NOTE — PROGRESS NOTES
2021   Chief Complaint   Patient presents with    Knee Pain     bilateral knee injection 3/26/21        History of Present Illness:                             Patsy Reno is a 55 y.o. male who presents today for evaluation of his bilateral knee pain. He had a steroid injection performed to both knees on 3/26/2021. He noticed relief a lot of his aching pain and swelling and has been able to use his knees more effectively. He still has some stiffness and occasional aching pain that is manageable with rest and anti-inflammatory medications as needed      Patient presents to the office today for bilateral knee injection given by Saran Centeno on 3/26/21 pt states that the injections did help with his pain. Pt states pain today is a 2/10 he has been working with physical therapy which does help with his pain. Pt states at work he will wear a knee brace which does help with the satiability of the knees. Pt states overall he is doing much better since his last visit. Medical History  Patient's medications, allergies, past medical, surgical, social and family histories were reviewed and updated as appropriate. Past Medical History:   Diagnosis Date    Allergic rhinitis     Hypertension     Morbid obesity ([address]      No past surgical history on file.   Family History   Problem Relation Age of Onset    High Blood Pressure Mother     Diabetes Mother     Lupus Mother          29    Heart Disease Sister     Heart Disease Paternal Grandmother     Stroke Paternal Grandmother     Diabetes Maternal Uncle     High Blood Pressure Maternal Grandmother     Diabetes Maternal Grandmother     Stroke Maternal Grandmother     High Blood Pressure Maternal Grandfather     Diabetes Maternal Cousin      Social History     Socioeconomic History    Marital status: Single     Spouse name: None    Number of children: None    Years of education: None    Highest education level: None   Occupational History    None   Social Needs    Financial resource strain: None    Food insecurity     Worry: None     Inability: None    Transportation needs     Medical: None     Non-medical: None   Tobacco Use    Smoking status: Never Smoker    Smokeless tobacco: Never Used   Substance and Sexual Activity    Alcohol use: Yes     Alcohol/week: 0.0 standard drinks     Types: 6 - 8 Glasses of wine per week    Drug use: No    Sexual activity: Yes     Partners: Female   Lifestyle    Physical activity     Days per week: None     Minutes per session: None    Stress: None   Relationships    Social connections     Talks on phone: None     Gets together: None     Attends Jew service: None     Active member of club or organization: None     Attends meetings of clubs or organizations: None     Relationship status: None    Intimate partner violence     Fear of current or ex partner: None     Emotionally abused: None     Physically abused: None     Forced sexual activity: None   Other Topics Concern    None   Social History Narrative    None     Current Outpatient Medications   Medication Sig Dispense Refill    famotidine (PEPCID) 40 MG tablet Take 1 tablet by mouth 2 times daily 60 tablet 5    amLODIPine (NORVASC) 10 MG tablet Take 1 tablet by mouth daily 30 tablet 5    atorvastatin (LIPITOR) 80 MG tablet Take 1 tablet by mouth daily 30 tablet 5    sildenafil (VIAGRA) 100 MG tablet Take 1 tablet by mouth as needed for Erectile Dysfunction 10 tablet 11    losartan (COZAAR) 50 MG tablet Take 50 mg by mouth daily      Chlorpheniramine-APAP (CORICIDIN) 2-325 MG TABS Take 2 tablets by mouth every 4 hours as needed      Multiple Vitamins-Minerals (THERAPEUTIC MULTIVITAMIN-MINERALS) tablet Take 1 tablet by mouth daily With Vit D      Blood Pressure Monitoring (B-D ASSURE BPM/DELUXE ARM CUFF) MISC Use as directed for blood pressure monitoring.  1 each 0    aspirin 81 MG chewable tablet Take 1 tablet by mouth daily 30 tablet 0     No current facility-administered medications for this visit. Allergies   Allergen Reactions    Other      ABX Creams      Benzalkonium Chloride Rash    Neosporin [Neomycin-Polymyxin-Gramicidin] Hives, Itching and Rash       Review of Systems:   Review of Systems   Constitutional: Negative for chills and fever. HENT: Negative for congestion and sneezing. Eyes: Negative for pain and redness. Respiratory: Negative for chest tightness, shortness of breath and wheezing. Cardiovascular: Negative for chest pain and palpitations. Gastrointestinal: Negative for vomiting. Musculoskeletal: Positive for arthralgias. Skin: Negative for color change and rash. Neurological: Negative for weakness and numbness. Psychiatric/Behavioral: Negative for agitation. The patient is not nervous/anxious. Examination:  General Exam:  Vitals: Resp 15   Ht 6' 1\" (1.854 m)   Wt 300 lb (136.1 kg)   BMI 39.58 kg/m²    Physical Exam  Vitals and nursing note reviewed. Constitutional:       Appearance: Normal appearance. He is obese. HENT:      Head: Normocephalic and atraumatic. Eyes:      Conjunctiva/sclera: Conjunctivae normal.      Pupils: Pupils are equal, round, and reactive to light. Pulmonary:      Effort: Pulmonary effort is normal.   Musculoskeletal:      Cervical back: Normal range of motion. Right hip: No deformity, tenderness, bony tenderness or crepitus. Normal range of motion. Normal strength. Left hip: Normal.      Left knee: Swelling and effusion present. No deformity, ecchymosis or lacerations. Decreased range of motion. Tenderness present over the medial joint line. No lateral joint line tenderness. No LCL laxity or MCL laxity. Normal alignment and normal meniscus. Comments: Left Lower Extremity:    There is mild diffuse tenderness to palpation throughout the knee maximally along the medial joint line.   There is mild global swelling anteriorly at the knee with no obvious effusion. There is 5 out of 5 strength with knee flexion and extension. Sensation is intact throughout the lower extremity. There is full range of motion at the knee with discomfort at the extremes of motion, especially terminal flexion. No instability with varus or valgus stress testing or anterior/posterior drawer testing. Medial Abisai's test produces mild pain but no palpable click. Skin is intact. Normal knee alignment. Pulses intact. Right Lower Extremity:    There is mild diffuse tenderness to palpation throughout the knee maximally along the medial joint line. There is mild global swelling anteriorly at the knee with no obvious effusion. There is 5 out of 5 strength with knee flexion and extension. Sensation is intact throughout the lower extremity. There is full range of motion at the knee with discomfort at the extremes of motion, especially terminal flexion. No instability with varus or valgus stress testing or anterior/posterior drawer testing. Medial Abisai's test produces mild pain but no palpable click. Skin is intact. Normal knee alignment. Pulses intact. Skin:     General: Skin is warm and dry. Neurological:      Mental Status: He is alert and oriented to person, place, and time. Psychiatric:         Mood and Affect: Mood normal.         Behavior: Behavior normal.            Diagnostic testing:  X-ray images were reviewed by myself and discussed with the patient:      Office Procedures:  No orders of the defined types were placed in this encounter. Assessment and Plan  1. Bilateral knee primary osteoarthritis    Patient has responded well to steroid injections and bracing and anti-inflammatory medications. He is doing well with physical therapy advised him to continue home exercise program and continue to lose weight.   We discussed the degenerative joint disease that is present and I expect that he will need to manage this moving forward. He may benefit from repeat injections potentially with surgical invention in the future if he fails conservative measures.     Follow-up as needed    Electronically signed by Debbie Andrews MD on 5/7/2021 at 9:47 AM

## 2021-05-07 NOTE — PATIENT INSTRUCTIONS
Continue weight-bearing as tolerated. Continue range of motion exercises as instructed. Ice and elevate as needed. Tylenol or Motrin for pain.   Continue working with physical therapy   Wear brace as needed  Follow up as needed may receive a steroid injection ever 3 month

## 2021-05-10 ENCOUNTER — HOSPITAL ENCOUNTER (OUTPATIENT)
Dept: PHYSICAL THERAPY | Age: 46
Discharge: HOME | End: 2021-05-10

## 2021-05-14 ENCOUNTER — HOSPITAL ENCOUNTER (OUTPATIENT)
Dept: PHYSICAL THERAPY | Age: 46
Setting detail: THERAPIES SERIES
Discharge: HOME | End: 2021-05-14
Payer: COMMERCIAL

## 2021-05-14 PROCEDURE — 97110 THERAPEUTIC EXERCISES: CPT

## 2021-05-14 NOTE — FLOWSHEET NOTE
Outpatient Physical Therapy  [place]           [x] Phone: [phone]   Fax: [phone]  [place]           [] Phone: [phone]   Fax: [phone]        Physical Therapy Daily Treatment Note  Date:  2021    Patient Name:  Brook Feng    :  1975  MRN: [id number]  Restrictions/Precautions:  none  Diagnosis:   Diagnosis: Primary OA of R knee  Date of Injury/Surgery:   Treatment Diagnosis: Treatment Diagnosis: R knee pain, RLE weakness    Insurance/Certification information: PT Insurance Information: [employer] [facility]   Referring Physician:  Referring Practitioner: Vijaya Rodriguez PA-C  Next Doctor Visit:    Plan of care signed (Y/N):  yes  Outcome Measure: LEFS:   Visit# / total visits:   5/10  Pain level: 0/10       Goals:       Short term goals  Time Frame for Short term goals: 5 visits  Short term goal 1: Pt will be IND with HEP in order to maximize recovery outside of clinic: GOAL MET   Long term goals  Time Frame for Long term goals : 10 visits  Long term goal 1: Pt will improve RLE strength to 4+/5 or higher to aide in ADLs  Long term goal 2: Pt will improve hamstring 90/90 to 10 or less to aide in gait  Long term goal 3: Pt will improve LEFS to 33 or lower to show MDC and subjective improvement    Summary of Evaluation: Assessment: Pt is a 44-year-old male who presents to therapy with chronic R knee pain, worsening since November when he started a new job which he is on his feet more often. Upon assessment, pt presents with impairments in RLE strength, reduced hamstring and hip flexor flexibility, tight IT band, R knee pain and overall reduced independence with pain free ADL/IADL completion. Pt would benefit from skilled therapy interventions to address listed impairments, progress toward goal completion and improve ADL/IADL status. PT also warranted to reduce risk for further injury or decline.         Subjective:  Pt 25 min late, thought his appointment was at 830. Any changes in Ambulatory Summary Sheet? None        Objective:    COVID screening questions were asked and patient attested that there had been no contact or symptoms        Exercises: (No more than 4 columns)   Exercise/Equipment 5/3/21 #3 5/7/21 #4 5/14/21 #5           WARM UP         Nu Step  C0,H85,V85,4' L4,S13,A10,5'         TABLE      SLR 2x10 2x10 2x10   Hip flexor stretch Off table edge 2x30\"  Manual 2x30\" SL Manual 2x30\" SL   Hamstring stretch 2x30\"  Manual (Hold-relax - 2')    clams 2x10, 3\" RTB 2x10, 3\" RTB    Bridges  10x 5\" count 10x 5\" count            STANDING      STS X20, eccentric focus 2 laps (approx. 120') X10, eccentric focus   Step ups  x10 fwd 6\"  x10 lateral 6\"    Lateral band walk  2 laps RTB                                 PROPRIOCEPTION                                    MODALITIES                      Other Therapeutic Activities/Education:  HEP and importance of completion    Home Exercise Program: Issued, practiced and pt demo ability to perform 4/16/2021    Manual Treatments:      Modalities:  none      Communication with other providers:  POC sent      Assessment:  Pt tolerated today's treatment well with no adverse reactions. He demonstrates some weakness in his R quadriceps. Pt will continue to benefit from additional skilled intervention to improve strength and ROM. End pain: 0/10.        Plan for Next Session: nustep, hip strengthening, IT band rolling, STM, stretching    Time In / Time Out:  0825 / 0845    Timed Code/Total Treatment Minutes:  20 / 20 1TE    Next Progress Note due:  10th visit    Plan of Care Interventions:  [x] Therapeutic Exercise  [x] Modalities:  [x] Therapeutic Activity     [x] Ultrasound  [x] Estim  [x] Gait Training      [] Cervical Traction [] Lumbar Traction  [x] Neuromuscular Re-education    [x] Cold/hotpack [] Iontophoresis   [x] Instruction in HEP      [x] Vasopneumatic   [] Dry Needling    [x] Manual Therapy               [] Aquatic Therapy              Electronically signed by:  Noel Sales, JORDI     5/14/2021, 8:30 AM

## 2021-05-14 NOTE — FLOWSHEET NOTE
Physical Therapy  Cancellation/No-show Note  Patient Name:  Iglesia Hunt  :  1975   Date:  2021  Cancelled visits to date: 1  No-shows to date: 1    For today's appointment patient:  []  Cancelled  []  Rescheduled appointment  [x]  No-show     Reason given by patient:  []  Patient ill  []  Conflicting appointment  []  No transportation    []  Conflict with work  []  No reason given  []  Other:     Comments:      Electronically signed by:  Yasmany Rodney PTA

## 2021-05-15 ASSESSMENT — ENCOUNTER SYMPTOMS
SHORTNESS OF BREATH: 0
EYE REDNESS: 0
VOMITING: 0
WHEEZING: 0
COLOR CHANGE: 0
EYE PAIN: 0
CHEST TIGHTNESS: 0

## 2021-05-17 ENCOUNTER — HOSPITAL ENCOUNTER (OUTPATIENT)
Dept: PHYSICAL THERAPY | Age: 46
Setting detail: THERAPIES SERIES
Discharge: HOME | End: 2021-05-17
Payer: COMMERCIAL

## 2021-05-17 PROCEDURE — 97140 MANUAL THERAPY 1/> REGIONS: CPT

## 2021-05-17 PROCEDURE — 97110 THERAPEUTIC EXERCISES: CPT

## 2021-05-17 NOTE — FLOWSHEET NOTE
Outpatient Physical Therapy  [place]           [x] Phone: [phone]   Fax: [phone]  [place]           [] Phone: [phone]   Fax: [phone]        Physical Therapy Daily Treatment Note  Date:  2021    Patient Name:  Sarah Hoffman    :  1975  MRN: [id number]  Restrictions/Precautions:  none  Diagnosis:   Diagnosis: Primary OA of R knee  Date of Injury/Surgery:   Treatment Diagnosis: Treatment Diagnosis: R knee pain, RLE weakness    Insurance/Certification information: PT Insurance Information: [employer] [facility]   Referring Physician:  Referring Practitioner: Lili Royal PA-C  Next Doctor Visit:    Plan of care signed (Y/N):  yes  Outcome Measure: LEFS:   Visit# / total visits:   6/10  Pain level: 1/10       Goals:       Short term goals  Time Frame for Short term goals: 5 visits  Short term goal 1: Pt will be IND with HEP in order to maximize recovery outside of clinic: GOAL MET   Long term goals  Time Frame for Long term goals : 10 visits  Long term goal 1: Pt will improve RLE strength to 4+/5 or higher to aide in ADLs  Long term goal 2: Pt will improve hamstring 90/90 to 10 or less to aide in gait  Long term goal 3: Pt will improve LEFS to 33 or lower to show MDC and subjective improvement    Summary of Evaluation: Assessment: Pt is a 63-year-old male who presents to therapy with chronic R knee pain, worsening since November when he started a new job which he is on his feet more often. Upon assessment, pt presents with impairments in RLE strength, reduced hamstring and hip flexor flexibility, tight IT band, R knee pain and overall reduced independence with pain free ADL/IADL completion. Pt would benefit from skilled therapy interventions to address listed impairments, progress toward goal completion and improve ADL/IADL status. PT also warranted to reduce risk for further injury or decline. Subjective:  Pt is doing well this date.  He was having some slight knee pain due to forgetting his brace yesterday during yard work, but work is going well with brace on. Pt notes that the one thing that is still bothering him is the stairs. Any changes in Ambulatory Summary Sheet? None        Objective:    COVID screening questions were asked and patient attested that there had been no contact or symptoms    Pt was 25 min late for apt this date  Improved tolerance to hamstring stretch compared to eval    Exercises: (No more than 4 columns)   Exercise/Equipment 5/7/21 #4 5/14/21 #5 5/17/21 #6           WARM UP         Nu Step Y6,S28,U44,9' L4,S13,A10,5'          TABLE      SLR 2x10 2x10 2x10, 1#   Hip flexor stretch Manual 2x30\" SL Manual 2x30\" SL Manual SL   Hamstring stretch Manual (Hold-relax - 2')  2x30\"    clams 2x10, 3\" RTB     Bridges 10x 5\" count 10x 5\" count x15            STANDING      STS 2 laps (approx. 120') X10, eccentric focus x10 eccentric focus   Step ups x10 fwd 6\"  x10 lateral 6\"  x15 anterior 6\"  x15 lateral 6\"   Lateral band walk 2 laps RTB     stairs   Practice w/ keeping weight more on heel for more glute vs force through knee                          PROPRIOCEPTION                                    MODALITIES                      Other Therapeutic Activities/Education:  HEP and importance of completion, dc plans    Home Exercise Program: Issued, practiced and pt demo ability to perform 4/16/2021    Manual Treatments:  Hamstring stick rolling in prone    Modalities:  none      Communication with other providers:  POC sent      Assessment:  Pt tolerated today's treatment without any adverse reactions or complications this date. Pt continues to show good improvement with reduced pain outside of clinic and will reduce to 1x/week for next few weeks to prepare for dc. Pt had reduced knee pain with altered stair completion this date after cues given.  Pt would continue to benefit from skilled therapy interventions to address remaining impairments,

## 2021-05-28 ENCOUNTER — HOSPITAL ENCOUNTER (OUTPATIENT)
Dept: PHYSICAL THERAPY | Age: 46
Discharge: HOME | End: 2021-05-28

## 2021-05-28 NOTE — FLOWSHEET NOTE
Physical Therapy  Cancellation/No-show Note  Patient Name:  Pb [place]  :  1975   Date:  2021  Cancelled visits to date: 2  No-shows to date: 0    For today's appointment patient:  [x]  Cancelled  []  Rescheduled appointment  []  No-show     Reason given by patient:  []  Patient ill  []  Conflicting appointment  []  No transportation    []  Conflict with work  [x]  No reason given  []  Other:     Comments:      Electronically signed by:  Abi Carolina PTA    8:10 AM  2021

## 2021-06-04 ENCOUNTER — HOSPITAL ENCOUNTER (OUTPATIENT)
Dept: PHYSICAL THERAPY | Age: 46
Setting detail: THERAPIES SERIES
Discharge: HOME | End: 2021-06-04
Payer: COMMERCIAL

## 2021-06-04 PROCEDURE — 97140 MANUAL THERAPY 1/> REGIONS: CPT

## 2021-06-04 PROCEDURE — 97110 THERAPEUTIC EXERCISES: CPT

## 2021-06-04 NOTE — FLOWSHEET NOTE
Outpatient Physical Therapy  [place]           [x] Phone: [phone]   Fax: [phone]  [place]           [] Phone: [phone]   Fax: [phone]        Physical Therapy Daily Treatment Note  Date:  2021    Patient Name:  Brigette Cedeño    :  1975  MRN: [id number]  Restrictions/Precautions:  none  Diagnosis:   Diagnosis: Primary OA of R knee  Date of Injury/Surgery:   Treatment Diagnosis: Treatment Diagnosis: R knee pain, RLE weakness    Insurance/Certification information: PT Insurance Information: [employer] [facility]   Referring Physician:  Referring Practitioner: Edyta Farr PA-C  Next Doctor Visit:    Plan of care signed (Y/N):  yes  Outcome Measure: LEFS:   Visit# / total visits:   7/10  Pain level: 4/10       Goals:       Short term goals  Time Frame for Short term goals: 5 visits  Short term goal 1: Pt will be IND with HEP in order to maximize recovery outside of clinic: GOAL ReMET   Long term goals  Time Frame for Long term goals : 10 visits  Long term goal 1: Pt will improve RLE strength to 4+/5 or higher to aide in ADLs: MET   Long term goal 2: Pt will improve hamstring 90/90 to 10 or less to aide in gait: Almost MET   Long term goal 3: Pt will improve LEFS to 51 or higher to show [address] and subjective improvement: MET 6/    Summary of Evaluation: Assessment: Pt is a 70-year-old male who presents to therapy with chronic R knee pain, worsening since November when he started a new job which he is on his feet more often. Upon assessment, pt presents with impairments in RLE strength, reduced hamstring and hip flexor flexibility, tight IT band, R knee pain and overall reduced independence with pain free ADL/IADL completion. Pt would benefit from skilled therapy interventions to address listed impairments, progress toward goal completion and improve ADL/IADL status. PT also warranted to reduce risk for further injury or decline.         Subjective:  Pt notes that he is having more pain this date than usual. He was twisting on his knee a lot last Friday at work and ended up having significant swelling and pain afterwards that kept him from doing almost anything the next day. He is also now having clicking when he walks on his R side. Overall, he was doing very well with almost no pain and difficulty with work or walking prior to this instance last week and therapy has been helping. LEFS: 62/80    Any changes in Ambulatory Summary Sheet? None        Objective:    COVID screening questions were asked and patient attested that there had been no contact or symptoms    Gross RLE strength: 5/5 all muscle groups  R hamstring 90/90: 12 deg until straight  -PT assessed pt R knee this date due to recent inflammation and injury at work per pt report. PT does not suspect any ligament or tendon injury at this time, possible medial meniscus irritation and overuse pain. Will continue to monitor in sessions. Exercises: (No more than 4 columns)   Exercise/Equipment 5/14/21 #5 5/17/21 #6 6/4/21 #7           WARM UP         Nu Step N0,H27,X61,8'  scifit x5'         TABLE      SLR 2x10 2x10, 1#    Hip flexor stretch Manual 2x30\" SL Manual SL    Hamstring stretch  2x30\"     clams      Bridges 10x 5\" count x15             STANDING      STS X10, eccentric focus x10 eccentric focus    Step ups  x15 anterior 6\"  x15 lateral 6\"    Lateral band walk      stairs  Practice w/ keeping weight more on heel for more glute vs force through knee    gait   Working on pattern for reduced rotation to reduce knee pressure on medial side                    PROPRIOCEPTION                                    MODALITIES                      Other Therapeutic Activities/Education:  HEP and importance of completion, icing and rest for continued improvement of knee and course of tx if knee does not improve after next week.     Home Exercise Program: Issued, practiced and pt demo ability to perform 4/16/2021    Manual Treatments:  STM and IASTM to medial RLE including above and below knee. Modalities: Ice cup massage x5' medial R knee at end      Communication with other providers:  POC sent      Assessment:  Pt tolerated today's treatment without any adverse reactions or complications this date. Pt has shown very good progress initially in therapy to the point of minimal limitations and pain, until last week when he hurt his knee at work due to overuse and twisting while lifting heavy objects. Pt has shown good goal progression and has met almost all at this time. Pt to take a week off to help swelling and acute injury calm down then return for 2 more visits to determine if dc is appropriate of if pt should go back to MD if pain continues to have further assessment take place. Pt would continue to benefit from skilled therapy interventions to address remaining impairments, improve mobility and strength and progress toward goal completion while reducing risk for re-injury or further decline. End pain: 3/10.        Plan for Next Session: nustep, hip strengthening, IT band rolling, STM, stretching    Time In / Time Out: 0700 - 0748    Timed Code/Total Treatment Minutes: 43'/48': 8' TE x1, 35' MT x2, 5' ice massage x0    Next Progress Note due:  10th visit     Plan of Care Interventions:  [x] Therapeutic Exercise  [x] Modalities:  [x] Therapeutic Activity     [x] Ultrasound  [x] Estim  [x] Gait Training      [] Cervical Traction [] Lumbar Traction  [x] Neuromuscular Re-education    [x] Cold/hotpack [] Iontophoresis   [x] Instruction in HEP      [x] Vasopneumatic   [] Dry Needling    [x] Manual Therapy               [] Aquatic Therapy              Electronically signed by:  Joseph Bailey PT, DPT  6/4/2021, 6:51 AM

## 2021-06-04 NOTE — PROGRESS NOTES
Outpatient Physical Therapy           [place]           [x] Phone: [phone]   Fax: [phone]  [place]           [] Phone: [phone]   Fax: [phone]      To:   Rashmi Sandhu PA-C    From: Mesfin Pinto, PT, DPT     Patient: Nat Monroe                  : 1975   Diagnosis:    Primary OA of R knee     Date: 2021  Treatment Diagnosis:   R knee pain, RLE weakness        [x]  Progress Note                []  Discharge Note    Evaluation Date:  21  Total Visits to date: 7   Cancels/No-shows to date:  2    Subjective:  Pt notes that he is having more pain this date than usual. He was twisting on his knee a lot last Friday at work and ended up having significant swelling and pain afterwards that kept him from doing almost anything the next day. He is also now having clicking when he walks on his R side. Overall, he was doing very well with almost no pain and difficulty with work or walking prior to this instance last week and therapy has been helping. LEFS: 62/80      Plan of Care/Treatment to date:  [x] Therapeutic Exercise    [x] Modalities:  [x] Therapeutic Activity     [] Ultrasound  [] Electrical Stimulation  [] Gait Training      [] Cervical Traction   [] Lumbar Traction  [x] Neuromuscular Re-education  [x] Cold/hotpack [] Iontophoresis  [x] Instruction in HEP      Other:  [x] Manual Therapy       [x]  Vasopneumatic  [] Aquatic Therapy       []   Dry Needle Therapy                      Objective/Significant Findings At Last Visit/Comments:    Gross RLE strength: 5/5 all muscle groups  R hamstring 90/90: 12 deg until straight  -PT assessed pt R knee this date due to recent inflammation and injury at work per pt report. PT does not suspect any ligament or tendon injury at this time, possible medial meniscus irritation and overuse pain. Will continue to monitor in sessions.     Assessment:  Pt has shown very good progress initially in therapy to the point of minimal limitations and pain, until last week when he hurt his knee at work due to overuse and twisting while lifting heavy objects. Pt has shown good goal progression and has met almost all at this time. Pt to take a week off to help swelling and acute injury calm down then return for 2 more visits to determine if dc is appropriate of if pt should go back to MD if pain continues to have further assessment take place. Pt would continue to benefit from skilled therapy interventions to address remaining impairments, improve mobility and strength and progress toward goal completion while reducing risk for re-injury or further decline. Goal Status:  [] Achieved [x] Partially Achieved  [] Not Achieved     Changes to goals:    Short term goals  Time Frame for Short term goals: 5 visits  Short term goal 1: Pt will be IND with HEP in order to maximize recovery outside of clinic: GOAL ReMET 6/4  Long term goals  Time Frame for Long term goals : 10 visits  Long term goal 1: Pt will improve RLE strength to 4+/5 or higher to aide in ADLs: MET 6/4  Long term goal 2: Pt will improve hamstring 90/90 to 10 or less to aide in gait: Almost MET 6/4  Long term goal 3: Pt will improve LEFS to 51 or higher to show [address] and subjective improvement: MET 6/4            Frequency/Duration:  # Days per week: [] 1 day # Weeks: [] 1 week [] 4 weeks [] 8 weeks     [x] 2 days   [] 2 weeks [] 5 weeks [] 10 weeks     [] 3 days   [x] 3 weeks [] 6 weeks [] 12 weeks       Rehab Potential: [] Excellent [x] Good [] Fair  [] Poor         Patient Status: [] Continue per initial plan of Care     [] Patient now discharged     [x] Additional visits requested, Please re-certify for additional visits:      Requested frequency/duration: 2x/ week for 3 weeks    If we are requesting more visits, we fully anticipate the patient's condition is expected to improve within the treatment timeframe we are requesting.     Electronically signed by:  Luis Gonzalez, PT, DPT, 6/4/2021, 6:52 AM    If you have any questions or concerns, please don't hesitate to call.   Thank you for your referral.    Physician Signature:______________________ Date:______ Time: ________  By signing above, therapists plan is approved by physician

## 2021-06-16 ENCOUNTER — HOSPITAL ENCOUNTER (OUTPATIENT)
Dept: PHYSICAL THERAPY | Age: 46
Setting detail: THERAPIES SERIES
Discharge: HOME | End: 2021-06-16
Payer: COMMERCIAL

## 2021-06-16 PROCEDURE — 97110 THERAPEUTIC EXERCISES: CPT

## 2021-06-16 PROCEDURE — 97140 MANUAL THERAPY 1/> REGIONS: CPT

## 2021-06-16 NOTE — FLOWSHEET NOTE
Outpatient Physical Therapy  [place]           [x] Phone: [phone]   Fax: [phone]  [place]           [] Phone: [phone]   Fax: [phone]        Physical Therapy Daily Treatment Note  Date:  2021    Patient Name:  Amor Rojas    :  1975  MRN: [id number]  Restrictions/Precautions:  none  Diagnosis:   Diagnosis: Primary OA of R knee  Date of Injury/Surgery:   Treatment Diagnosis: Treatment Diagnosis: R knee pain, RLE weakness    Insurance/Certification information: PT Insurance Information: [employer] [facility]   Referring Physician:  Referring Practitioner: Shaye Thornton PA-C  Next Doctor Visit:    Plan of care signed (Y/N):  yes  Outcome Measure: LEFS:   Visit# / total visits:   7/10  Pain level: 0/10       Goals:       Short term goals  Time Frame for Short term goals: 5 visits  Short term goal 1: Pt will be IND with HEP in order to maximize recovery outside of clinic: GOAL ReMET 6  Long term goals  Time Frame for Long term goals : 10 visits  Long term goal 1: Pt will improve RLE strength to 4+/5 or higher to aide in ADLs: MET 6/4  Long term goal 2: Pt will improve hamstring 90/90 to 10 or less to aide in gait: Almost MET 6/4  Long term goal 3: Pt will improve LEFS to 51 or higher to show [address] and subjective improvement: MET 6/4    Summary of Evaluation: Assessment: Pt is a 66-year-old male who presents to therapy with chronic R knee pain, worsening since November when he started a new job which he is on his feet more often. Upon assessment, pt presents with impairments in RLE strength, reduced hamstring and hip flexor flexibility, tight IT band, R knee pain and overall reduced independence with pain free ADL/IADL completion. Pt would benefit from skilled therapy interventions to address listed impairments, progress toward goal completion and improve ADL/IADL status. PT also warranted to reduce risk for further injury or decline.         Subjective:  June Payment arrives to therapy stating that the knee is feeling good today. Feeling much better since his most recent flare up. He feels ready for d/c next session. States that icing at home is really helping. Any changes in Ambulatory Summary Sheet? None        Objective:  COVID screening questions were asked and patient attested that there had been no contact or symptoms    Popping noted in the knee during the last 4 repetitions of sit-stand exercise. Exercises: (No more than 4 columns)   Exercise/Equipment 5/17/21 #6 6/4/21 #7 6/16/2021 #8           WARM UP      Nu Step  scifit x5' 5' sci fit          TABLE      SLR 2x10, 1#  1# 2*10   Hip flexor stretch Manual SL  Manual SL 2*30\" R only    Hamstring stretch 2x30\"   2*30\" ea LE edge of mat table    Bridges x15  15*3\"            STANDING      STS x10 eccentric focus  10* ecc focus    Step ups x15 anterior 6\"  x15 lateral 6\"  Ant/Lat 6\" 2*10 ea    Lateral band walk   BTB 2*10 ea dir    stairs Practice w/ keeping weight more on heel for more glute vs force through knee  -   gait  Working on pattern for reduced rotation to reduce knee pressure on medial side -                    PROPRIOCEPTION                                    MODALITIES                      Other Therapeutic Activities/Education:     Home Exercise Program: Issued, practiced and pt demo ability to perform 4/16/2021    Manual Treatments:  SL hip flexor stretch 2*30\"    Modalities:        Communication with other providers:  POC sent      Assessment:  Markie Hogan tolerated today's session well. He was able to progress back into his regular exercise program without increased pain. He continues to report discomfort during stepping activities.  End session pain: 0/10 just tight       Plan for Next Session: nustep, hip strengthening, IT band rolling, STM, stretching    Time In / Time Out: 8004/1817    Timed Code/Total Treatment Minutes: 28' 1 TA 10' 1 TE 22'    Next Progress Note due:  10th visit     Plan of Care Interventions:  [x] Therapeutic Exercise  [x] Modalities:  [x] Therapeutic Activity     [x] Ultrasound  [x] Estim  [x] Gait Training      [] Cervical Traction [] Lumbar Traction  [x] Neuromuscular Re-education    [x] Cold/hotpack [] Iontophoresis   [x] Instruction in HEP      [x] Vasopneumatic   [] Dry Needling    [x] Manual Therapy               [] Aquatic Therapy              Electronically signed by:  Albert Franklin PTA      6/16/2021, 5:44 PM

## 2021-06-17 ENCOUNTER — HOSPITAL ENCOUNTER (OUTPATIENT)
Dept: PHYSICAL THERAPY | Age: 46
Setting detail: THERAPIES SERIES
Discharge: HOME | End: 2021-06-17
Payer: COMMERCIAL

## 2021-06-17 PROCEDURE — 97112 NEUROMUSCULAR REEDUCATION: CPT

## 2021-06-17 PROCEDURE — 97110 THERAPEUTIC EXERCISES: CPT

## 2021-06-17 NOTE — DISCHARGE SUMMARY
goals  Time Frame for Short term goals: 5 visits  Short term goal 1: Pt will be IND with HEP in order to maximize recovery outside of clinic: GOAL ReMET 6/17  Long term goals  Time Frame for Long term goals : 10 visits  Long term goal 1: Pt will improve RLE strength to 4+/5 or higher to aide in ADLs: ReMET 6/17  Long term goal 2: Pt will improve hamstring 90/90 to 10 or less to aide in gait: MET 6/17  Long term goal 3: Pt will improve LEFS to 51 or higher to show [address] and subjective improvement: ReMET 6/17    Patient Status: [] Continue per initial plan of Care     [x] Patient now discharged     [] Additional visits requested, Please re-certify for additional visits:      Requested frequency/duration: 2x/ week for 3 weeks    If we are requesting more visits, we fully anticipate the patient's condition is expected to improve within the treatment timeframe we are requesting. Electronically signed by:  Bhavesh Dickerson PT, DPT, 6/17/2021, 10:21 AM    If you have any questions or concerns, please don't hesitate to call.   Thank you for your referral.    Physician Signature:______________________ Date:______ Time: ________  By signing above, therapists plan is approved by physician

## 2021-06-17 NOTE — FLOWSHEET NOTE
Outpatient Physical Therapy  [place]           [x] Phone: [phone]   Fax: [phone]  [place]           [] Phone: [phone]   Fax: [phone]        Physical Therapy Daily Treatment Note  Date:  2021    Patient Name:  Jorden Stevens    :  1975  MRN: [id number]  Restrictions/Precautions:  none  Diagnosis:   Diagnosis: Primary OA of R knee  Date of Injury/Surgery:   Treatment Diagnosis: Treatment Diagnosis: R knee pain, RLE weakness    Insurance/Certification information: PT Insurance Information: [employer] [facility]   Referring Physician:  Referring Practitioner: Ronel Garner PA-C  Next Doctor Visit:    Plan of care signed (Y/N):  yes  Outcome Measure: LEFS: 69/80  Visit# / total visits:   9/10  Pain level: 0/10       Goals:       Short term goals  Time Frame for Short term goals: 5 visits  Short term goal 1: Pt will be IND with HEP in order to maximize recovery outside of clinic: GOAL ReMET   Long term goals  Time Frame for Long term goals : 10 visits  Long term goal 1: Pt will improve RLE strength to 4+/5 or higher to aide in ADLs: ReMET   Long term goal 2: Pt will improve hamstring 90/90 to 10 or less to aide in gait: MET   Long term goal 3: Pt will improve LEFS to 51 or higher to show [address] and subjective improvement: ReMET     Summary of Evaluation: Assessment: Pt is a 51-year-old male who presents to therapy with chronic R knee pain, worsening since November when he started a new job which he is on his feet more often. Upon assessment, pt presents with impairments in RLE strength, reduced hamstring and hip flexor flexibility, tight IT band, R knee pain and overall reduced independence with pain free ADL/IADL completion. Pt would benefit from skilled therapy interventions to address listed impairments, progress toward goal completion and improve ADL/IADL status. PT also warranted to reduce risk for further injury or decline.         Subjective:  Pt is doing well this date. He is no longer having any pain or limitations with his knee with ADL/IADL or work related tasks. He is still having some clicking when he walks but it does not cause pain or limitations. He is still wearing his brace at work. LEFS: 69/80    Any changes in Ambulatory Summary Sheet? None      Objective:  COVID screening questions were asked and patient attested that there had been no contact or symptoms    Gross RLE strength: 5/5 all muscle groups  R hamstring 90/90: 1 deg until straight    Exercises: (No more than 4 columns)   Exercise/Equipment 6/4/21 #7 6/16/2021 #8 6/17/21 #9           WARM UP      Nu Step scifit x5' 5' sci fit  sci fit x5' L2         TABLE      SLR  1# 2*10    Hip flexor stretch  Manual SL 2*30\" R only  Standing 2x30\" RLE   Hamstring stretch  2*30\" ea LE edge of mat table  2x30\" ea BLE   Bridges  15*3\"             STANDING      STS  10* ecc focus  x15 eccentric focus   Step ups  Ant/Lat 6\" 2*10 ea     Lateral band walk  BTB 2*10 ea dir     stairs  -    gait Working on pattern for reduced rotation to reduce knee pressure on medial side -                     PROPRIOCEPTION                                    MODALITIES                      Other Therapeutic Activities/Education: HEP and importance of completion after dc    Home Exercise Program: Issued, practiced and pt demo ability to perform 4/16/2021    Manual Treatments:      Modalities:        Communication with other providers:  POC sent      Assessment:  Pt tolerated today's treatment without any adverse reactions or complications this date. Pt has shown good progress since therapy start and is no longer having any limitations outside of clinic. His pain is gone and is IND with all ADL and IADLs. Pt has met all goals at this time. PT educated on continuation of wearing brace at work for reduced risk for re-injury.  PT educated pt on continuation of HEP after discharge for max benefits and reduced risk for future

## 2021-08-27 ENCOUNTER — OFFICE VISIT (OUTPATIENT)
Dept: FAMILY MEDICINE CLINIC | Age: 46
End: 2021-08-27
Payer: COMMERCIAL

## 2021-08-27 VITALS
SYSTOLIC BLOOD PRESSURE: 122 MMHG | BODY MASS INDEX: 40.42 KG/M2 | TEMPERATURE: 97.3 F | DIASTOLIC BLOOD PRESSURE: 80 MMHG | HEIGHT: 73 IN | HEART RATE: 88 BPM | WEIGHT: 305 LBS

## 2021-08-27 DIAGNOSIS — R14.0 BLOATING: ICD-10-CM

## 2021-08-27 DIAGNOSIS — N52.9 ERECTILE DYSFUNCTION, UNSPECIFIED ERECTILE DYSFUNCTION TYPE: ICD-10-CM

## 2021-08-27 DIAGNOSIS — K21.9 GASTROESOPHAGEAL REFLUX DISEASE WITHOUT ESOPHAGITIS: ICD-10-CM

## 2021-08-27 DIAGNOSIS — I10 ESSENTIAL HYPERTENSION: Primary | ICD-10-CM

## 2021-08-27 DIAGNOSIS — Z12.11 SCREEN FOR COLON CANCER: ICD-10-CM

## 2021-08-27 DIAGNOSIS — E78.1 HIGH TRIGLYCERIDES: ICD-10-CM

## 2021-08-27 DIAGNOSIS — F10.10 ALCOHOL ABUSE: ICD-10-CM

## 2021-08-27 DIAGNOSIS — R14.0 ABDOMINAL BLOATING: ICD-10-CM

## 2021-08-27 PROCEDURE — 99214 OFFICE O/P EST MOD 30 MIN: CPT | Performed by: FAMILY MEDICINE

## 2021-08-27 RX ORDER — SILDENAFIL 100 MG/1
100 TABLET, FILM COATED ORAL PRN
Qty: 10 TABLET | Refills: 11 | Status: SHIPPED | OUTPATIENT
Start: 2021-08-27 | End: 2022-10-03 | Stop reason: SDUPTHER

## 2021-08-27 RX ORDER — AMLODIPINE BESYLATE 10 MG/1
10 TABLET ORAL DAILY
Qty: 90 TABLET | Refills: 1 | Status: SHIPPED | OUTPATIENT
Start: 2021-08-27 | End: 2022-03-29 | Stop reason: SDUPTHER

## 2021-08-27 RX ORDER — ATORVASTATIN CALCIUM 80 MG/1
80 TABLET, FILM COATED ORAL DAILY
Qty: 90 TABLET | Refills: 1 | Status: SHIPPED | OUTPATIENT
Start: 2021-08-27 | End: 2022-03-29 | Stop reason: SDUPTHER

## 2021-08-27 RX ORDER — FAMOTIDINE 40 MG/1
40 TABLET, FILM COATED ORAL 2 TIMES DAILY
Qty: 180 TABLET | Refills: 1 | Status: SHIPPED | OUTPATIENT
Start: 2021-08-27 | End: 2022-10-03 | Stop reason: SDUPTHER

## 2021-08-27 ASSESSMENT — ENCOUNTER SYMPTOMS
COUGH: 0
SHORTNESS OF BREATH: 0
ABDOMINAL PAIN: 0

## 2021-08-27 NOTE — PATIENT INSTRUCTIONS
Need help scheduling your covid vaccine? [address] [phone]       PLEASE BRING YOUR MEDICATIONS TO ALL APPOINTMENTS    The diagnoses and medications listed in this after visit summary may not be accurate at the time of check out. Please check MY CHART in 28-48 hours for possible corrections. Late cancellation policy: So that we can better accommodate people who are sick, please give our office 24 hour notice for an appointment cancellation. Thank you. Missed appointments: Your care is very important to us. It is important that you keep your scheduled appointments. Multiple missed appointments will lead to a dismissal from the office. Later arrival policy: If you are more than 10 minutes late for your appointment, you will be asked to re-schedule. Please allow 5-7 business days for paperwork to be processed. It is important that you check your MY Chart messages, as they include appointment reminders, test results, and other important information. If you have forgotten your password, please call [phone]. HERE ARE SOME LIFE CHANGING TIPS      1. Take your blood pressure medications at bedtime. This reduces your chance of cardiovascular event by half  2. Fever in kids:  Give both Tylenol and Ibuprofen at the same time rather than staggering them which is confusing  3. Follow these tips to reduce childhood obesity: Reduce unnecessary exposure to antibiotics, consume whole milk instead of skim milk, watch public TV instead of regular TV (less exposure to junk food commercials), and reduce traumatic experiences. 4. 1 egg per day is good for your heart  5. Alternate day fasting does promote weight loss. Skipping breakfast increases your risk of obesity  6. Artificially sweetened drinks increase all cause mortality (strokes, BMI, cardiovascular)  7. Kale consumption can reduce onset of dementia  8.  Walking at least 8000 steps per day and resistance exercise 2-3 x per week are good for your heart  9. Brushing teeth 3 times per day can decrease chance of getting diabetes      Patient Education        Gas and Bloating: Care Instructions  Your Care Instructions     Gas and bloating can be uncomfortable and embarrassing problems. All people pass gas, but some people produce more gas than others, sometimes enough to cause distress. It is normal to pass gas from 6 to 20 times per day. Excess gas usually is not caused by a serious health problem. Gas and bloating usually are caused by something you eat or drink, including some food supplements and medicines. Gas and bloating are usually harmless and go away without treatment. However, changing your diet can help end the problem. Some over-the-counter medicines can help prevent gas and relieve bloating. Follow-up care is a key part of your treatment and safety. Be sure to make and go to all appointments, and call your doctor if you are having problems. It's also a good idea to know your test results and keep a list of the medicines you take. How can you care for yourself at home? · Keep a food diary if you think a food gives you gas. Write down what you eat or drink. Also record when you get gas. If you notice that a food seems to cause your gas each time, avoid it and see if the gas goes away. Examples of foods that cause gas include:  ? Fried and fatty foods. ? Beans. ? Vegetables such as artichokes, asparagus, broccoli, brussels sprouts, cabbage, cauliflower, cucumbers, green peppers, onions, peas, radishes, and raw potatoes. ? Fruits such as apricots, bananas, melons, peaches, pears, prunes, and raw apples. ? Wheat and wheat bran. · Soak dry beans in water overnight, then dump the water and cook the soaked beans in new water. This can help prevent gas and bloating. · If you have problems with lactose, avoid dairy products such as milk and cheese. · Try not to swallow air.  Do not drink through a straw, gulp your food, or chew gum. · Take an over-the-counter medicine. Read and follow all instructions on the label. ? Food enzymes, such as Beano, can be added to gas-producing foods to prevent gas. ? Antacids, such as Maalox Anti-Gas and Mylanta Gas, can relieve bloating by making you burp. Be careful when you take over-the-counter antacid medicines. Many of these medicines have aspirin in them. Read the label to make sure that you are not taking more than the recommended dose. Too much aspirin can be harmful. ? Activated charcoal tablets, such as CharcoCaps, may decrease odor from gas you pass. ? If you have problems with lactose, you can take medicines such as Dairy Ease and Lactaid with dairy products to prevent gas and bloating. · Get some exercise regularly. When should you call for help? Call 911 anytime you think you may need emergency care. For example, call if:    · You have gas and signs of a heart attack, such as:  ? Chest pain or pressure. ? Sweating. ? Shortness of breath. ? Nausea or vomiting. ? Pain that spreads from the chest to the neck, jaw, or one or both shoulders or arms. ? Dizziness or lightheadedness. ? A fast or uneven pulse. After calling 911, chew 1 adult-strength aspirin. Wait for an ambulance. Do not try to drive yourself. Call your doctor now or seek immediate medical care if:    · You have severe belly pain.     · You have blood in your stool. Watch closely for changes in your health, and be sure to contact your doctor if:    · You have blood or pus in your urine.     · Your urine is cloudy or smells bad.     · You are burping and have trouble swallowing.     · You feel bloated and have swelling in your belly.     · You do not get better as expected. Where can you learn more? Go to https://Distillanitaeb.BaseTrace. org and sign in to your [employer] account. Enter U697 in the [employer] box to learn more about \"Gas and Bloating: Care Instructions. \"     If you do

## 2021-08-27 NOTE — PROGRESS NOTES
Patient ID: Porsche Stubbs 1975    . Chief Complaint   Patient presents with    Hypertension    Hyperlipidemia    Alcohol Problem         Hypertension  This is a chronic problem. The current episode started more than 1 month ago. The problem is controlled. Pertinent negatives include no chest pain, headaches, palpitations or shortness of breath. Past treatments include calcium channel blockers. The current treatment provides significant improvement. Compliance problems include psychosocial issues and diet. Hyperlipidemia  This is a chronic problem. The current episode started more than 1 year ago. The problem is uncontrolled. Pertinent negatives include no chest pain, myalgias or shortness of breath. Current antihyperlipidemic treatment includes statins. Compliance problems include adherence to exercise. Alcohol Problem  Primary symptoms comment: consumes wine. not interested in treatment. Pertinent negatives include no fever. Gastroesophageal Reflux  He reports no abdominal pain, no chest pain or no coughing (no unusual). consumes wine. not interested in treatment. He has tried a histamine-2 antagonist for the symptoms. Erectile Dysfunction  This is a chronic problem. Past treatments include sildenafil. The treatment provided significant relief. Bloating: Ongoing for a while. Drinks pop and this causes him to burp and the bloating gets better. He has been trying to change how he is eating and his wife is doing a keto diet. Review of Systems   Constitutional: Negative for fever. Respiratory: Negative for cough (no unusual) and shortness of breath. Cardiovascular: Negative for chest pain and palpitations. Gastrointestinal: Negative for abdominal pain. Musculoskeletal: Negative for myalgias. Neurological: Negative for headaches.        Patient Active Problem List   Diagnosis    Allergic rhinitis    Bilateral varicoceles    Hematuria    Erectile dysfunction    Essential hypertension    Iron deficiency anemia    Obesity, Class III, BMI 40-49.9 (morbid obesity) (HCC)    B12 deficiency    Low testosterone level in male    Fatty liver    Alcohol abuse    Abnormal LFTs    LVH (left ventricular hypertrophy)    Diastolic dysfunction       No past surgical history on file. Family History   Problem Relation Age of Onset    High Blood Pressure Mother     Diabetes Mother     Lupus Mother          29    Heart Disease Sister     Heart Disease Paternal Grandmother     Stroke Paternal Grandmother     Diabetes Maternal Uncle     High Blood Pressure Maternal Grandmother     Diabetes Maternal Grandmother     Stroke Maternal Grandmother     High Blood Pressure Maternal Grandfather     Diabetes Maternal Cousin        Current Outpatient Medications on File Prior to Visit   Medication Sig Dispense Refill    losartan (COZAAR) 50 MG tablet Take 50 mg by mouth daily      Chlorpheniramine-APAP (CORICIDIN) 2-325 MG TABS Take 2 tablets by mouth every 4 hours as needed      Multiple Vitamins-Minerals (THERAPEUTIC MULTIVITAMIN-MINERALS) tablet Take 1 tablet by mouth daily With Vit D      Blood Pressure Monitoring (B-D ASSURE BPM/DELUXE ARM CUFF) MISC Use as directed for blood pressure monitoring. 1 each 0    aspirin 81 MG chewable tablet Take 1 tablet by mouth daily 30 tablet 0     No current facility-administered medications on file prior to visit. Objective:         Physical Exam  Vitals and nursing note reviewed. Constitutional:       Appearance: He is well-developed. He is morbidly obese. HENT:      Head: Normocephalic and atraumatic. Cardiovascular:      Rate and Rhythm: Normal rate and regular rhythm. Heart sounds: Normal heart sounds, S1 normal and S2 normal.   Pulmonary:      Effort: Pulmonary effort is normal. No respiratory distress. Breath sounds: Normal breath sounds. No wheezing or rales.    Abdominal:      General: Bowel sounds are normal.      Palpations: Abdomen is soft. There is no mass. Tenderness: There is no abdominal tenderness. Musculoskeletal:      Right lower leg: No edema. Left lower leg: No edema. Skin:     General: Skin is warm and dry. Neurological:      Mental Status: He is alert. Psychiatric:         Speech: Speech normal.         Behavior: Behavior normal.       Vitals:    08/27/21 0844   BP: 122/80   Pulse: 88   Temp: 97.3 °F (36.3 °C)   TempSrc: Infrared   Weight: (!) 305 lb (138.3 kg)   Height: 6' 1\" (1.854 m)     Body mass index is 40.24 kg/m². Wt Readings from Last 3 Encounters:   08/27/21 (!) 305 lb (138.3 kg)   05/07/21 300 lb (136.1 kg)   03/26/21 300 lb (136.1 kg)     BP Readings from Last 3 Encounters:   08/27/21 122/80   03/04/21 128/82   09/10/20 122/78          No results found for this visit on 08/27/21. The ASCVD Risk score (Floridalma Aaron, et al., 2013) failed to calculate for the following reasons: The valid total cholesterol range is 130 to 320 mg/dL  Lab Review   No visits with results within 2 Month(s) from this visit.    Latest known visit with results is:   Office Visit on 03/04/2021   Component Date Value    WBC 03/04/2021 4.3     RBC 03/04/2021 4.73     Hemoglobin 03/04/2021 13.1*    Hematocrit 03/04/2021 39.7*    MCV 03/04/2021 83.9     MCH 03/04/2021 27.7     MCHC 03/04/2021 33.0     RDW 03/04/2021 13.4     Platelets 44/97/8375 283     MPV 03/04/2021 9.9     Neutrophils % 03/04/2021 47.1     Lymphocytes % 03/04/2021 44.2     Monocytes % 03/04/2021 6.7     Eosinophils % 03/04/2021 1.5     Basophils % 03/04/2021 0.5     Neutrophils Absolute 03/04/2021 2.0     Lymphocytes Absolute 03/04/2021 1.9     Monocytes Absolute 03/04/2021 0.3     Eosinophils Absolute 03/04/2021 0.1     Basophils Absolute 03/04/2021 0.0     Hemoglobin A1C 03/04/2021 4.9     eAG 03/04/2021 93.9     Hep C Ab Interp 03/04/2021 Non-reactive     Cholesterol, Total 03/04/2021 126 test has not been returned. If the fit test is positive, then the patient will be referred for a colonoscopy. Return in about 25 weeks (around 2/18/2022) for HTN, Hyperlipid, Fit cards/ visit in 1-2 weeks, Fasting.

## 2021-10-19 ENCOUNTER — TELEPHONE (OUTPATIENT)
Dept: FAMILY MEDICINE CLINIC | Age: 46
End: 2021-10-19

## 2021-10-19 ENCOUNTER — VIRTUAL VISIT (OUTPATIENT)
Dept: FAMILY MEDICINE CLINIC | Age: 46
End: 2021-10-19
Payer: COMMERCIAL

## 2021-10-19 DIAGNOSIS — R19.7 DIARRHEA, UNSPECIFIED TYPE: Primary | ICD-10-CM

## 2021-10-19 PROCEDURE — 99213 OFFICE O/P EST LOW 20 MIN: CPT | Performed by: FAMILY MEDICINE

## 2021-10-19 RX ORDER — DICYCLOMINE HCL 20 MG
20 TABLET ORAL 4 TIMES DAILY
Qty: 20 TABLET | Refills: 0 | Status: SHIPPED | OUTPATIENT
Start: 2021-10-19 | End: 2022-10-03

## 2021-10-19 NOTE — TELEPHONE ENCOUNTER
Patient called stating he has been having very runny diarrhea since Saturday morning at 5 AM.Patient stated there is no  Mucus to it, it just very runny. Patient stated can you please call him something in for his diarrhea.  Please advise     Thank you

## 2021-10-19 NOTE — PROGRESS NOTES
10/19/2021    TELEHEALTH EVALUATION -- Audio/Visual (During WIRRG-60 public health emergency)    HPI:    Keren Mcghee (:  1975) has requested an audio/video evaluation for the following concern(s):    Diarrhea: ongoing x 4 days. Nauseated. Wife with GI complaints as well but not severe. D x 5-7 per day. Watery. Charcoal bender last night. Now hemorrhoids flaring and seeing streaks of blood. No cough, no SOB. No F, chills, sweats. Tried Pepto and Gatorade. Fully vaccinated against covid  Review of Systems    Prior to Visit Medications    Medication Sig Taking? Authorizing Provider   famotidine (PEPCID) 40 MG tablet Take 1 tablet by mouth 2 times daily Yes Sally Robles MD   sildenafil (VIAGRA) 100 MG tablet Take 1 tablet by mouth as needed for Erectile Dysfunction Yes Sally Robles MD   atorvastatin (LIPITOR) 80 MG tablet Take 1 tablet by mouth daily Yes Sally Robles MD   amLODIPine (NORVASC) 10 MG tablet Take 1 tablet by mouth daily Yes Sally Robles MD   losartan (COZAAR) 50 MG tablet Take 50 mg by mouth daily Yes Historical Provider, MD   Chlorpheniramine-APAP (CORICIDIN) 2-325 MG TABS Take 2 tablets by mouth every 4 hours as needed Yes Historical Provider, MD   Multiple Vitamins-Minerals (THERAPEUTIC MULTIVITAMIN-MINERALS) tablet Take 1 tablet by mouth daily With Vit D Yes Historical Provider, MD   Blood Pressure Monitoring (B-D ASSURE BPM/DELUXE ARM CUFF) MISC Use as directed for blood pressure monitoring. Yes Dixie Orr MD   aspirin 81 MG chewable tablet Take 1 tablet by mouth daily Yes Gilberto Fermin MD       Social History     Tobacco Use    Smoking status: Never Smoker    Smokeless tobacco: Never Used   Substance Use Topics    Alcohol use:  Yes     Alcohol/week: 0.0 standard drinks     Types: 6 - 8 Glasses of wine per week    Drug use: No        Allergies   Allergen Reactions    Other      ABX Creams      Benzalkonium Chloride Rash    Neosporin [Neomycin-Polymyxin-Gramicidin] Hives, Itching and Rash   ,   Past Medical History:   Diagnosis Date    Allergic rhinitis     Hypertension     Morbid obesity ([facility]        PHYSICAL EXAMINATION:  [ INSTRUCTIONS:  \"[x]\" Indicates a positive item  \"[]\" Indicates a negative item  -- DELETE ALL ITEMS NOT EXAMINED]  Vital Signs: (As obtained by patient/caregiver or practitioner observation)    Blood pressure-  Heart rate-    Respiratory rate-    Temperature-  Pulse oximetry-     Constitutional: [x] Appears well-developed and well-nourished [] No apparent distress      [] Abnormal-   Mental status  [x] Alert and awake  [x] Oriented to person/place/time [x]Able to follow commands      Eyes:  EOM    []  Normal  [] Abnormal-  Sclera  []  Normal  [] Abnormal -         Discharge []  None visible  [] Abnormal -    HENT:   [x] Normocephalic, atraumatic. [] Abnormal   [] Mouth/Throat: Mucous membranes are moist.     External Ears [x] Normal  [] Abnormal-     Neck: [x] No visualized mass     Pulmonary/Chest: [x] Respiratory effort normal.  [x] No visualized signs of difficulty breathing or respiratory distress        [] Abnormal-      Musculoskeletal:   [x] Normal gait with no signs of ataxia         [] Normal range of motion of neck        [] Abnormal-       Neurological:        [x] No Facial Asymmetry (Cranial nerve 7 motor function) (limited exam to video visit)          [] No gaze palsy        [] Abnormal-         Skin:        [x] No significant exanthematous lesions or discoloration noted on facial skin         [] Abnormal-            Psychiatric:       [x] Normal Affect [x] No Hallucinations        [] Abnormal-     Other pertinent observable physical exam findings-     ASSESSMENT/PLAN:  There are no diagnoses linked to this encounter. Diagnosis Orders   1. Diarrhea, unspecified type  dicyclomine (BENTYL) 20 MG tablet     Imodium if he becomes incontinent of stool. Otherwise try to avoid the Imodium.     Lots of handwashing and bathroom sanitation    [place] foods such as rice, banana, potatoes, noodles. Call if symptoms worsen. Return to work on Thursday. No follow-ups on file. Benjamin Hero, was evaluated through a synchronous (real-time) audio-video encounter. The patient (or guardian if applicable) is aware that this is a billable service. Verbal consent to proceed has been obtained within the past 12 months. The visit was conducted pursuant to the emergency declaration under the [address] [place] [address], [address] [place] [address] authority and the [employer] General Act. Patient identification was verified, and a caregiver was present when appropriate. The patient was located in a state where the provider was credentialed to provide care. Total time spent on this encounter: Not billed by time    --Othelia Boeck, MD on 10/19/2021 at 9:24 AM    An electronic signature was used to authenticate this note.

## 2021-11-30 ENCOUNTER — TELEPHONE (OUTPATIENT)
Dept: FAMILY MEDICINE CLINIC | Age: 46
End: 2021-11-30

## 2021-11-30 NOTE — TELEPHONE ENCOUNTER
Patient was returning the call about the fit test being brought back. Patient stated he will return the card around 12/7/2021 Patient stated the reason he did not bring it back, he did not have any insurance.  His insurance will be kicking in around 12/7/2021

## 2021-11-30 NOTE — TELEPHONE ENCOUNTER
----- Message from Mehrdad Wise sent at 11/29/2021  4:26 PM EST -----  Subject: Message to Provider    QUESTIONS  Information for Provider? Calling regarding colon test. Waiting on new   insurance to be in effect on 12/07/2021  ---------------------------------------------------------------------------  --------------  CALL BACK INFO  What is the best way for the office to contact you? OK to leave message on   voicemail  Preferred Call Back Phone Number? [phone]  ---------------------------------------------------------------------------  --------------  SCRIPT ANSWERS  Relationship to Patient?  Self

## 2021-11-30 NOTE — TELEPHONE ENCOUNTER
If he is asking about colon cancer screening, we can discuss this at his next appointment. If he is having any symptoms colon cancer now, then he needs to schedule an appointment.

## 2021-12-10 ENCOUNTER — HOSPITAL ENCOUNTER (OUTPATIENT)
Age: 46
Setting detail: SPECIMEN
Discharge: HOME | End: 2021-12-10
Payer: COMMERCIAL

## 2021-12-10 LAB — MICRO: NORMAL

## 2021-12-10 PROCEDURE — 89321 SEMEN ANAL SPERM DETECTION: CPT

## 2022-01-15 ENCOUNTER — HOSPITAL ENCOUNTER (EMERGENCY)
Age: 47
Discharge: HOME | End: 2022-01-15
Attending: EMERGENCY MEDICINE
Payer: COMMERCIAL

## 2022-01-15 ENCOUNTER — APPOINTMENT (OUTPATIENT)
Dept: CT IMAGING | Age: 47
End: 2022-01-15
Payer: COMMERCIAL

## 2022-01-15 VITALS
HEART RATE: 89 BPM | RESPIRATION RATE: 18 BRPM | SYSTOLIC BLOOD PRESSURE: 128 MMHG | WEIGHT: 300 LBS | DIASTOLIC BLOOD PRESSURE: 70 MMHG | HEIGHT: 72 IN | TEMPERATURE: 98.1 F | OXYGEN SATURATION: 99 % | BODY MASS INDEX: 40.63 KG/M2

## 2022-01-15 DIAGNOSIS — S39.012A BACK STRAIN, INITIAL ENCOUNTER: Primary | ICD-10-CM

## 2022-01-15 DIAGNOSIS — N28.9 RENAL LESION: ICD-10-CM

## 2022-01-15 PROCEDURE — 6370000000 HC RX 637 (ALT 250 FOR IP): Performed by: EMERGENCY MEDICINE

## 2022-01-15 PROCEDURE — 6360000002 HC RX W HCPCS: Performed by: EMERGENCY MEDICINE

## 2022-01-15 PROCEDURE — 96372 THER/PROPH/DIAG INJ SC/IM: CPT

## 2022-01-15 PROCEDURE — 99284 EMERGENCY DEPT VISIT MOD MDM: CPT

## 2022-01-15 PROCEDURE — 74176 CT ABD & PELVIS W/O CONTRAST: CPT

## 2022-01-15 RX ORDER — LIDOCAINE 4 G/G
1 PATCH TOPICAL ONCE
Status: DISCONTINUED | OUTPATIENT
Start: 2022-01-15 | End: 2022-01-15 | Stop reason: HOSPADM

## 2022-01-15 RX ORDER — METHOCARBAMOL 500 MG/1
1000 TABLET, FILM COATED ORAL 4 TIMES DAILY
Qty: 80 TABLET | Refills: 0 | Status: SHIPPED | OUTPATIENT
Start: 2022-01-15 | End: 2022-01-25

## 2022-01-15 RX ORDER — HYDROCODONE BITARTRATE AND ACETAMINOPHEN 5; 325 MG/1; MG/1
1 TABLET ORAL ONCE
Status: DISCONTINUED | OUTPATIENT
Start: 2022-01-15 | End: 2022-01-15

## 2022-01-15 RX ORDER — HYDROMORPHONE HCL 110MG/55ML
1 PATIENT CONTROLLED ANALGESIA SYRINGE INTRAVENOUS ONCE
Status: COMPLETED | OUTPATIENT
Start: 2022-01-15 | End: 2022-01-15

## 2022-01-15 RX ORDER — METHOCARBAMOL 500 MG/1
1500 TABLET, FILM COATED ORAL ONCE
Status: COMPLETED | OUTPATIENT
Start: 2022-01-15 | End: 2022-01-15

## 2022-01-15 RX ORDER — NAPROXEN 500 MG/1
500 TABLET ORAL 2 TIMES DAILY
Qty: 60 TABLET | Refills: 0 | Status: SHIPPED | OUTPATIENT
Start: 2022-01-15 | End: 2022-03-29

## 2022-01-15 RX ORDER — LIDOCAINE 50 MG/G
1 PATCH TOPICAL DAILY
Qty: 10 PATCH | Refills: 0 | Status: SHIPPED | OUTPATIENT
Start: 2022-01-15 | End: 2022-01-25

## 2022-01-15 RX ADMIN — METHOCARBAMOL TABLETS 1500 MG: 500 TABLET, COATED ORAL at 06:14

## 2022-01-15 RX ADMIN — HYDROMORPHONE HYDROCHLORIDE 1 MG: 2 INJECTION, SOLUTION INTRAMUSCULAR; INTRAVENOUS; SUBCUTANEOUS at 06:14

## 2022-01-15 ASSESSMENT — PAIN DESCRIPTION - LOCATION: LOCATION: BACK

## 2022-01-15 ASSESSMENT — PAIN SCALES - GENERAL
PAINLEVEL_OUTOF10: 7
PAINLEVEL_OUTOF10: 8

## 2022-01-15 NOTE — ED PROVIDER NOTES
Triage Chief Complaint:   Back Pain    [place]:  Sen Michel is a 55 y.o. male that presents with back pain. Patient was baseline state of health until this past week when he changed jobs at [employer] he works at. Patient reports that he normally is not active and just has to monitor progress at work however his new job he is much more active and does a lot of twisting and bending. Since his switch patient has been with increasing pain to the right mid back. Patient does report that pain radiates along his right side and does feel like everything is being \"pulled downward\". Pain is currently 7 out of 10, constant and worse with movements and activity. No fall or trauma. No fevers or chills. No urinary symptoms. Patient did trial a tablet of Norco that he had leftover from his vasectomy with no improvement and this prompted ED visit. No numbness tingling or weakness, saddle anesthesia, no bowel or bladder dysfunction, and no rash. No personal history of cancer. No trauma. No IV drug abuse. ROS:  General:  No fevers  ENT:  No sore throat, no nasal congestion  Cardiovascular:  No chest pain  Respiratory:  No shortness of breath  Gastrointestinal:  No pain, no nausea, no vomiting, no diarrhea  Musculoskeletal:  + back pain, + muscle pain  Skin:  No rash, no pruritis  Neurologic:  No headache, no extremity numbness, no extremity tingling, no extremity weakness  Genitourinary:  No dysuria, no hematuria  Endocrine:  No unexpected weight gain, no unexpected weight loss  Extremities:  no edema, no pain    Past Medical History:   Diagnosis Date    Allergic rhinitis     Hypertension     Morbid obesity ([facility]      History reviewed. No pertinent surgical history.   Family History   Problem Relation Age of Onset    High Blood Pressure Mother     Diabetes Mother     Lupus Mother          29    Heart Disease Sister     Heart Disease Paternal Grandmother     Stroke Paternal Grandmother     Diabetes Maternal Uncle     High Blood Pressure Maternal Grandmother     Diabetes Maternal Grandmother     Stroke Maternal Grandmother     High Blood Pressure Maternal Grandfather     Diabetes Maternal Cousin      Social History     Socioeconomic History    Marital status: Single     Spouse name: Not on file    Number of children: Not on file    Years of education: Not on file    Highest education level: Not on file   Occupational History    Not on file   Tobacco Use    Smoking status: Never Smoker    Smokeless tobacco: Never Used   Substance and Sexual Activity    Alcohol use: Yes     Alcohol/week: 0.0 standard drinks     Types: 6 - 8 Glasses of wine per week    Drug use: No    Sexual activity: Yes     Partners: Female   Other Topics Concern    Not on file   Social History Narrative    Not on file     Social Determinants of Health     Financial Resource Strain:     Difficulty of Paying Living Expenses: Not on file   Food Insecurity:     Worried About Running Out of Food in the Last Year: Not on file    Ramy of Food in the Last Year: Not on file   Transportation Needs:     Lack of Transportation (Medical): Not on file    Lack of Transportation (Non-Medical):  Not on file   Physical Activity:     Days of Exercise per Week: Not on file    Minutes of Exercise per Session: Not on file   Stress:     Feeling of Stress : Not on file   Social Connections:     Frequency of Communication with Friends and Family: Not on file    Frequency of Social Gatherings with Friends and Family: Not on file    Attends Sikh Services: Not on file    Active Member of Clubs or Organizations: Not on file    Attends Club or Organization Meetings: Not on file    Marital Status: Not on file   Intimate Partner Violence:     Fear of Current or Ex-Partner: Not on file    Emotionally Abused: Not on file    Physically Abused: Not on file    Sexually Abused: Not on file   Housing Stability:     Unable to Pay for Housing in the Last Year: Not on file    Number of Places Lived in the Last Year: Not on file    Unstable Housing in the Last Year: Not on file     Current Facility-Administered Medications   Medication Dose Route Frequency Provider Last Rate Last Admin    lidocaine 4 % external patch 1 patch  1 patch TransDERmal Once Berle Apley, MD   1 patch at 01/15/22 7412     Current Outpatient Medications   Medication Sig Dispense Refill    dicyclomine (BENTYL) 20 MG tablet Take 1 tablet by mouth 4 times daily 20 tablet 0    famotidine (PEPCID) 40 MG tablet Take 1 tablet by mouth 2 times daily 180 tablet 1    sildenafil (VIAGRA) 100 MG tablet Take 1 tablet by mouth as needed for Erectile Dysfunction 10 tablet 11    atorvastatin (LIPITOR) 80 MG tablet Take 1 tablet by mouth daily 90 tablet 1    amLODIPine (NORVASC) 10 MG tablet Take 1 tablet by mouth daily 90 tablet 1    losartan (COZAAR) 50 MG tablet Take 50 mg by mouth daily      Chlorpheniramine-APAP (CORICIDIN) 2-325 MG TABS Take 2 tablets by mouth every 4 hours as needed      Multiple Vitamins-Minerals (THERAPEUTIC MULTIVITAMIN-MINERALS) tablet Take 1 tablet by mouth daily With Vit D      Blood Pressure Monitoring (B-D ASSURE BPM/DELUXE ARM CUFF) MISC Use as directed for blood pressure monitoring. 1 each 0    aspirin 81 MG chewable tablet Take 1 tablet by mouth daily 30 tablet 0     Allergies   Allergen Reactions    Other      ABX Creams      Benzalkonium Chloride Rash    Neosporin [Neomycin-Polymyxin-Gramicidin] Hives, Itching and Rash       Nursing Notes Reviewed    Physical Exam:  ED Triage Vitals [01/15/22 0542]   Enc Vitals Group      /70      Pulse 89      Resp 18      Temp 98.1 °F (36.7 °C)      Temp src       SpO2 99 %      Weight 300 lb (136.1 kg)      Height 6' (1.829 m)      Head Circumference       Peak Flow       Pain Score       Pain Loc       Pain Edu? Excl. in [address]?         My pulse ox interpretation is - normal    General appearance:  No acute distress. Skin:  Warm. Dry. No Rash   Eye:  Extraocular movements intact. Ears, nose, mouth and throat:  Oral mucosa moist   Neck:  Trachea midline. Extremity:  No swelling. Normal ROM     Heart:  Regular  Perfusion:  Intact   Respiratory:   Respirations nonlabored. Abdominal:  Normal bowel sounds. Soft. Nontender. Non distended. Back:  + Right-sided CVA tenderness to palpation, + midline spinal tenderness to palpation in the mid and lower thoracic spine reproducing pain as well as the right paraspinal musculature;+ mild lumbosacral paraspinal muscle tenderness to palpation, no spasm             Neurological:  Alert and oriented times 3.  5 out of 5 and symmetric motor strength bilateral lower extremities, sensation intact to light touch in bilateral lower extremities, 2+ and symmetric patellar reflexes, no saddle anesthesia   High Sensitivity Neuro Exam (HSNE) Spine Exam:  Lumbar Pain:   L1-L2 Cremasteric Reflex (inner thigh sensation)   L2 Adduct Thigh    L3 Extend Knee   L4 Dorsiflex Ankle (Up)   L5 Point Great Toe Up   L2 L3-L4 Knee Reflex   S1 Flex Knee   S2 Plantarflex Toes   S3, 4, 5 Groin, Perianal Sensation      I have reviewed and interpreted all of the currently available lab results from this visit (if applicable):  No results found for this visit on 01/15/22. Radiographs (if obtained):  [] The following radiograph was interpreted by myself in the absence of a radiologist:   [x] Radiologist's Report Reviewed:  CT ABDOMEN PELVIS WO CONTRAST Additional Contrast? None   Preliminary Result   1. No acute intra-abdominal abnormality. 2. Indeterminate right renal lesion measuring 13 mm. Recommend dedicated MR   in 3-6 months. 3. Normal appendix. Chart review shows recent radiographs:  No results found. MDM:  Patient presented with back pain.   CT imaging of abdomen/pelvis without contrast was pursued given the radiation of pain along the right costal margin and right flank to rule out non-MSK pain. Patient is treated symptomatically with intramuscular Dilaudid, oral Robaxin and Lidoderm patch. On recheck, reports improving pain. CT imaging is negative for acute process. No obstructive urolithiasis. No gallstones. Incidental finding of an indeterminate right renal lesion is noted with recommendation for MRI imaging this discussed with patient and included in discharge instructions. Presentation consistent with musculoskeletal low back pain/thoracic back pain. Patient will be given medications, I do believe the patient is a good candidate for outpatient symptomatic treatment. The patient was instructed on this treatment and the course that this type of back pain typically follows, I also discussed worrisome symptoms to monitor for at home including, but not limited to, numbness or weakness in the lower extremities, bowel or bladder dysfunction, and numbness in the groin. Patient will be discharged with prescriptions, instructions for symptomatic treatment, monitoring and outpatient follow-up, patient understands and agrees, return warnings given      Clinical Impression:  1. Back strain, initial encounter    2. Renal lesion      Disposition referral (if applicable):  Lara Rodriguez MD  [id number]  [phone]    Schedule an appointment as soon as possible for a visit       [facility]  [address]  [phone]  Today  If symptoms worsen    Disposition medications (if applicable):  New Prescriptions    No medications on file       Comment: Please note this report has been produced using speech recognition software and may contain errors related to that system including errors in grammar, punctuation, and spelling, as well as words and phrases that may be inappropriate.  If there are any questions or concerns please feel free to contact the dictating provider for clarification.        Carmelo Maharaj MD  01/15/22 3507

## 2022-03-29 ENCOUNTER — OFFICE VISIT (OUTPATIENT)
Dept: FAMILY MEDICINE CLINIC | Age: 47
End: 2022-03-29
Payer: COMMERCIAL

## 2022-03-29 VITALS
SYSTOLIC BLOOD PRESSURE: 120 MMHG | BODY MASS INDEX: 40.9 KG/M2 | DIASTOLIC BLOOD PRESSURE: 80 MMHG | HEIGHT: 72 IN | HEART RATE: 78 BPM | WEIGHT: 302 LBS | OXYGEN SATURATION: 98 %

## 2022-03-29 DIAGNOSIS — K76.0 FATTY LIVER: ICD-10-CM

## 2022-03-29 DIAGNOSIS — F10.10 ALCOHOL ABUSE: ICD-10-CM

## 2022-03-29 DIAGNOSIS — E53.8 B12 DEFICIENCY: ICD-10-CM

## 2022-03-29 DIAGNOSIS — E78.1 HIGH TRIGLYCERIDES: ICD-10-CM

## 2022-03-29 DIAGNOSIS — I10 ESSENTIAL HYPERTENSION: Primary | ICD-10-CM

## 2022-03-29 PROBLEM — R79.89 ABNORMAL LFTS: Status: RESOLVED | Noted: 2019-01-03 | Resolved: 2022-03-29

## 2022-03-29 LAB
A/G RATIO: 2.4 (ref 1.1–2.2)
ALBUMIN SERPL-MCNC: 5.2 G/DL (ref 3.4–5)
ALP BLD-CCNC: 102 U/L (ref 40–129)
ALT SERPL-CCNC: 42 U/L (ref 10–40)
ANION GAP SERPL CALCULATED.3IONS-SCNC: 15 MMOL/L (ref 3–16)
AST SERPL-CCNC: 26 U/L (ref 15–37)
BILIRUB SERPL-MCNC: 1.1 MG/DL (ref 0–1)
BUN BLDV-MCNC: 13 MG/DL (ref 7–20)
CALCIUM SERPL-MCNC: 9.7 MG/DL (ref 8.3–10.6)
CHLORIDE BLD-SCNC: 102 MMOL/L (ref 99–110)
CHOLESTEROL, TOTAL: 125 MG/DL (ref 0–199)
CO2: 23 MMOL/L (ref 21–32)
CREAT SERPL-MCNC: 1 MG/DL (ref 0.9–1.3)
GFR AFRICAN AMERICAN: >60
GFR NON-AFRICAN AMERICAN: >60
GLUCOSE BLD-MCNC: 83 MG/DL (ref 70–99)
HDLC SERPL-MCNC: 44 MG/DL (ref 40–60)
LDL CHOLESTEROL CALCULATED: 41 MG/DL
POTASSIUM SERPL-SCNC: 5.1 MMOL/L (ref 3.5–5.1)
SODIUM BLD-SCNC: 140 MMOL/L (ref 136–145)
TOTAL PROTEIN: 7.4 G/DL (ref 6.4–8.2)
TRIGL SERPL-MCNC: 201 MG/DL (ref 0–150)
VITAMIN B-12: <150 PG/ML (ref 211–911)
VLDLC SERPL CALC-MCNC: 40 MG/DL

## 2022-03-29 PROCEDURE — 36415 COLL VENOUS BLD VENIPUNCTURE: CPT | Performed by: FAMILY MEDICINE

## 2022-03-29 PROCEDURE — 99213 OFFICE O/P EST LOW 20 MIN: CPT | Performed by: FAMILY MEDICINE

## 2022-03-29 RX ORDER — ATORVASTATIN CALCIUM 80 MG/1
80 TABLET, FILM COATED ORAL DAILY
Qty: 90 TABLET | Refills: 1 | Status: SHIPPED | OUTPATIENT
Start: 2022-03-29 | End: 2022-10-03 | Stop reason: SDUPTHER

## 2022-03-29 RX ORDER — AMLODIPINE BESYLATE 10 MG/1
10 TABLET ORAL DAILY
Qty: 90 TABLET | Refills: 1 | Status: SHIPPED | OUTPATIENT
Start: 2022-03-29 | End: 2022-10-03 | Stop reason: SDUPTHER

## 2022-03-29 ASSESSMENT — PATIENT HEALTH QUESTIONNAIRE - PHQ9
3. TROUBLE FALLING OR STAYING ASLEEP: 2
SUM OF ALL RESPONSES TO PHQ QUESTIONS 1-9: 9
SUM OF ALL RESPONSES TO PHQ QUESTIONS 1-9: 9
2. FEELING DOWN, DEPRESSED OR HOPELESS: 2
5. POOR APPETITE OR OVEREATING: 0
8. MOVING OR SPEAKING SO SLOWLY THAT OTHER PEOPLE COULD HAVE NOTICED. OR THE OPPOSITE, BEING SO FIGETY OR RESTLESS THAT YOU HAVE BEEN MOVING AROUND A LOT MORE THAN USUAL: 0
9. THOUGHTS THAT YOU WOULD BE BETTER OFF DEAD, OR OF HURTING YOURSELF: 0
7. TROUBLE CONCENTRATING ON THINGS, SUCH AS READING THE NEWSPAPER OR WATCHING TELEVISION: 3
SUM OF ALL RESPONSES TO PHQ9 QUESTIONS 1 & 2: 4
SUM OF ALL RESPONSES TO PHQ QUESTIONS 1-9: 9
SUM OF ALL RESPONSES TO PHQ QUESTIONS 1-9: 9
6. FEELING BAD ABOUT YOURSELF - OR THAT YOU ARE A FAILURE OR HAVE LET YOURSELF OR YOUR FAMILY DOWN: 0
10. IF YOU CHECKED OFF ANY PROBLEMS, HOW DIFFICULT HAVE THESE PROBLEMS MADE IT FOR YOU TO DO YOUR WORK, TAKE CARE OF THINGS AT HOME, OR GET ALONG WITH OTHER PEOPLE: 0
4. FEELING TIRED OR HAVING LITTLE ENERGY: 0
1. LITTLE INTEREST OR PLEASURE IN DOING THINGS: 2

## 2022-03-29 ASSESSMENT — ENCOUNTER SYMPTOMS
ABDOMINAL PAIN: 0
COUGH: 0
SHORTNESS OF BREATH: 0

## 2022-03-29 NOTE — PATIENT INSTRUCTIONS
PLEASE BRING YOUR MEDICATIONS TO ALL APPOINTMENTS      Please check MY CHART for test results. If you have forgotten your password, please call [phone]. The diagnoses and medications listed in this after visit summary may not be up to date. Please check MY CHART in 28-48 hours for possible corrections. Late cancellation policy: So that we can better accommodate people who are sick, please give our office 24 hour notice for an appointment cancellation. Missed appointments: Your care is very important to us. It is important that you keep your scheduled appointments. Multiple missed appointments will lead to a dismissal from the office. Patients arriving late will be worked into the schedule as time permits, with patients arriving on time taken as scheduled. Late arriving patients are more than welcome to wait or reschedule their appointments. Please allow 5-7 business days for paperwork to be processed. HERE ARE SOME LIFE CHANGING TIPS      1. Take your blood pressure medications at bedtime to reduce your chance of heart attack or stroke  2. Fever in kids:  Give both Tylenol and Ibuprofen at the same time rather than staggering them   3. Follow these tips to reduce childhood obesity: Reduce unnecessary exposure to antibiotics, consume whole milk instead of skim milk, watch public TV instead of regular TV (less exposure to junk food commercials), and reduce traumatic experiences. 4. 1 egg per day is good for your heart  5. Alternate day fasting does promote weight loss. Skipping breakfast increases your risk of obesity  6. Artificially sweetened drinks increase all cause mortality (strokes, body mass index, cardiovascular disease)  7. Kale consumption can reduce onset of dementia  8. Walking at least 8000 steps per day and resistance exercise 2-3 x per week are good for your heart  9. Brushing teeth 3 times per day can decrease chance of getting diabetes  10.  Antibiotic use is associated with a lifetime increased risk of breast cancer and heart disease.

## 2022-03-29 NOTE — PROGRESS NOTES
Patient ID: Kandis Marsh 1975    . Chief Complaint   Patient presents with    Hypertension    Hyperlipidemia         Hypertension  This is a chronic problem. The current episode started more than 1 month ago. The problem is controlled. Pertinent negatives include no chest pain, headaches, palpitations or shortness of breath. Past treatments include calcium channel blockers. The current treatment provides significant improvement. Compliance problems include psychosocial issues and diet. Hyperlipidemia  This is a chronic problem. The current episode started more than 1 year ago. The problem is uncontrolled. Pertinent negatives include no chest pain, myalgias or shortness of breath. Current antihyperlipidemic treatment includes statins. Compliance problems include adherence to exercise. Alcohol Problem  Primary symptoms comment: consumes wine. not interested in treatment. Pertinent negatives include no fever. Gastroesophageal Reflux  He reports no abdominal pain, no chest pain or no coughing (no unusual). consumes wine. not interested in treatment. He has tried a histamine-2 antagonist for the symptoms. Erectile Dysfunction  This is a chronic problem. Past treatments include sildenafil. The treatment provided significant relief. B12 deficiency: He was not aware of this. Review of Systems   Constitutional: Negative for fever. Respiratory: Negative for cough (no unusual) and shortness of breath. Cardiovascular: Negative for chest pain and palpitations. Gastrointestinal: Negative for abdominal pain. Musculoskeletal: Negative for myalgias. Neurological: Negative for headaches.        Patient Active Problem List   Diagnosis    Allergic rhinitis    Bilateral varicoceles    Hematuria    Erectile dysfunction    Essential hypertension    Iron deficiency anemia    B12 deficiency    Low testosterone level in male    Fatty liver    Alcohol abuse    LVH (left ventricular hypertrophy)    Diastolic dysfunction    Adult BMI 40.0-44.9 kg/sq m (HCC)       No past surgical history on file. Family History   Problem Relation Age of Onset    High Blood Pressure Mother     Diabetes Mother     Lupus Mother          29    Heart Disease Sister     Heart Disease Paternal Grandmother     Stroke Paternal Grandmother     Diabetes Maternal Uncle     High Blood Pressure Maternal Grandmother     Diabetes Maternal Grandmother     Stroke Maternal Grandmother     High Blood Pressure Maternal Grandfather     Diabetes Maternal Cousin        Current Outpatient Medications on File Prior to Visit   Medication Sig Dispense Refill    famotidine (PEPCID) 40 MG tablet Take 1 tablet by mouth 2 times daily 180 tablet 1    sildenafil (VIAGRA) 100 MG tablet Take 1 tablet by mouth as needed for Erectile Dysfunction 10 tablet 11    Multiple Vitamins-Minerals (THERAPEUTIC MULTIVITAMIN-MINERALS) tablet Take 1 tablet by mouth daily With Vit D      Blood Pressure Monitoring (B-D ASSURE BPM/DELUXE ARM CUFF) MISC Use as directed for blood pressure monitoring. 1 each 0    aspirin 81 MG chewable tablet Take 1 tablet by mouth daily 30 tablet 0    dicyclomine (BENTYL) 20 MG tablet Take 1 tablet by mouth 4 times daily 20 tablet 0    losartan (COZAAR) 50 MG tablet Take 50 mg by mouth daily      Chlorpheniramine-APAP (CORICIDIN) 2-325 MG TABS Take 2 tablets by mouth every 4 hours as needed (Patient not taking: Reported on 3/29/2022)       No current facility-administered medications on file prior to visit. Objective:         Physical Exam  Vitals and nursing note reviewed. Constitutional:       Appearance: He is obese. HENT:      Head: Normocephalic and atraumatic. Cardiovascular:      Rate and Rhythm: Normal rate and regular rhythm.       Heart sounds: Normal heart sounds, S1 normal and S2 normal.   Pulmonary:      Effort: Pulmonary effort is normal.      Breath sounds: Normal breath sounds. Abdominal:      Palpations: Abdomen is soft. There is no mass. Tenderness: There is no abdominal tenderness. Skin:     General: Skin is warm and dry. Psychiatric:         Speech: Speech normal.         Behavior: Behavior normal.       Vitals:    03/29/22 0839   BP: 120/80   Site: Left Upper Arm   Position: Sitting   Cuff Size: Large Adult   Pulse: 78   SpO2: 98%   Weight: (!) 302 lb (137 kg)   Height: 6' (1.829 m)     Body mass index is 40.96 kg/m². Wt Readings from Last 3 Encounters:   03/29/22 (!) 302 lb (137 kg)   01/15/22 300 lb (136.1 kg)   08/27/21 (!) 305 lb (138.3 kg)     BP Readings from Last 3 Encounters:   03/29/22 120/80   01/15/22 128/70   08/27/21 122/80      Lab Review   No visits with results within 2 Month(s) from this visit. Latest known visit with results is:   Hospital Outpatient Visit on 12/10/2021   Component Date Value    MICRO 12/10/2021 FEW NONMOTILE SPERMATOZOA          No results found for this visit on 03/29/22. The ASCVD Risk score (Deuce Burgess, et al., 2013) failed to calculate for the following reasons: The valid total cholesterol range is 130 to 320 mg/dL  Lab Review   No visits with results within 2 Month(s) from this visit. Latest known visit with results is:   Hospital Outpatient Visit on 12/10/2021   Component Date Value    MICRO 12/10/2021 FEW NONMOTILE SPERMATOZOA            Assessment:       Diagnosis Orders   1. Essential hypertension  Comprehensive Metabolic Panel    Lipid Panel    amLODIPine (NORVASC) 10 MG tablet   2. Fatty liver     3. Alcohol abuse     4. Adult BMI 40.0-44.9 kg/sq m ([address]     5. B12 deficiency  Vitamin B12    cyanocobalamin ([facility] VITAMIN B12) 1000 MCG tablet   6. High triglycerides  atorvastatin (LIPITOR) 80 MG tablet           Plan:      Review of chart indicates that patient had a B12 prescription sent back in 2016. We will resend. Hypertension stable. Continue amlodipine.     Strongly urged him to follow-up with his cardiologist regarding his cardiomyopathy    Strongly encouraged him to stop drinking alcohol. Discussed fatty liver, fatty liver can lead to cirrhosis of the liver. Weight loss encouraged    Recheck in 6 months. Return in about 25 weeks (around 9/20/2022) for HTN, Hyperlipid, low b12.

## 2022-10-03 ENCOUNTER — OFFICE VISIT (OUTPATIENT)
Dept: FAMILY MEDICINE CLINIC | Age: 47
End: 2022-10-03
Payer: COMMERCIAL

## 2022-10-03 VITALS
DIASTOLIC BLOOD PRESSURE: 78 MMHG | WEIGHT: 297 LBS | HEART RATE: 95 BPM | SYSTOLIC BLOOD PRESSURE: 118 MMHG | BODY MASS INDEX: 40.23 KG/M2 | HEIGHT: 72 IN

## 2022-10-03 DIAGNOSIS — F10.10 ALCOHOL ABUSE: ICD-10-CM

## 2022-10-03 DIAGNOSIS — I10 ESSENTIAL HYPERTENSION: Primary | ICD-10-CM

## 2022-10-03 DIAGNOSIS — N52.9 ERECTILE DYSFUNCTION, UNSPECIFIED ERECTILE DYSFUNCTION TYPE: ICD-10-CM

## 2022-10-03 DIAGNOSIS — Z12.11 SCREEN FOR COLON CANCER: ICD-10-CM

## 2022-10-03 DIAGNOSIS — E78.1 HIGH TRIGLYCERIDES: ICD-10-CM

## 2022-10-03 DIAGNOSIS — K21.9 GASTROESOPHAGEAL REFLUX DISEASE WITHOUT ESOPHAGITIS: ICD-10-CM

## 2022-10-03 PROCEDURE — 99214 OFFICE O/P EST MOD 30 MIN: CPT | Performed by: FAMILY MEDICINE

## 2022-10-03 RX ORDER — FAMOTIDINE 40 MG/1
40 TABLET, FILM COATED ORAL 2 TIMES DAILY
Qty: 180 TABLET | Refills: 1 | Status: SHIPPED | OUTPATIENT
Start: 2022-10-03

## 2022-10-03 RX ORDER — ATORVASTATIN CALCIUM 80 MG/1
80 TABLET, FILM COATED ORAL DAILY
Qty: 90 TABLET | Refills: 1 | Status: SHIPPED | OUTPATIENT
Start: 2022-10-03

## 2022-10-03 RX ORDER — SILDENAFIL 100 MG/1
100 TABLET, FILM COATED ORAL PRN
Qty: 10 TABLET | Refills: 11 | Status: SHIPPED | OUTPATIENT
Start: 2022-10-03

## 2022-10-03 RX ORDER — AMLODIPINE BESYLATE 10 MG/1
10 TABLET ORAL DAILY
Qty: 90 TABLET | Refills: 1 | Status: SHIPPED | OUTPATIENT
Start: 2022-10-03

## 2022-10-03 RX ORDER — METOPROLOL SUCCINATE 25 MG/1
25 TABLET, EXTENDED RELEASE ORAL DAILY
COMMUNITY

## 2022-10-03 RX ORDER — CARBOXYMETHYLCELLULOSE/CITRIC 0.75 G
CAPSULE ORAL
Qty: 180 CAPSULE | Refills: 11 | Status: SHIPPED | OUTPATIENT
Start: 2022-10-03

## 2022-10-03 ASSESSMENT — ENCOUNTER SYMPTOMS
COUGH: 0
ABDOMINAL PAIN: 0
SHORTNESS OF BREATH: 0

## 2022-10-03 NOTE — PATIENT INSTRUCTIONS
BRING YOUR MEDICATION BOTTLES TO ALL APPOINTMENTS    Check MY CHART for test results. If you have forgotten your password, call [phone]. The diagnoses and medications listed in this after visit summary may not be up to date. Check MY CHART in 28-48 hours forcorrections. Late cancellation policy: So that we can better accommodate people who are sick, please give our office 24 hour notice for an appointment cancellation. Missed appointments: Your care is very important to us. It is important that you keep your scheduled appointments. Multiple missed appointments will lead to a dismissal from the office. Patients arriving late will be worked into the schedule as time permits, with patients arriving on time taken as scheduled. Late arriving patients are more than welcome to wait or reschedule their appointments. Please allow 5-7 business days for paperwork to be processed.

## 2022-10-03 NOTE — PROGRESS NOTES
Patient ID: Luis Segura 1975    . Chief Complaint   Patient presents with    Hypertension    Hyperlipidemia         Hypertension  This is a chronic problem. The current episode started more than 1 month ago. The problem is controlled. Pertinent negatives include no chest pain, headaches, palpitations or shortness of breath. Past treatments include calcium channel blockers. The current treatment provides significant improvement. Compliance problems include psychosocial issues and diet. Hyperlipidemia  This is a chronic problem. The current episode started more than 1 year ago. The problem is uncontrolled. Pertinent negatives include no chest pain, myalgias or shortness of breath. Current antihyperlipidemic treatment includes statins. Compliance problems include adherence to exercise. Alcohol Problem  Primary symptoms comment: consumes wine. not interested in treatment. Suspected agents: 1 bottle of wine per day. Pertinent negatives include no fever. Gastroesophageal Reflux  He reports no abdominal pain, no chest pain or no coughing (no unusual). consumes wine. not interested in treatment. He has tried a histamine-2 antagonist for the symptoms. Erectile Dysfunction  This is a chronic problem. Past treatments include sildenafil. The treatment provided significant relief. B12 deficiency: is now taking his B12 as directed  Patient Active Problem List   Diagnosis    Allergic rhinitis    Bilateral varicoceles    Hematuria    Erectile dysfunction    Essential hypertension    Iron deficiency anemia    B12 deficiency    Low testosterone level in male    Fatty liver    Alcohol abuse    Mild concentric left ventricular hypertrophy (LVH)    Diastolic dysfunction    Adult BMI 40.0-44.9 kg/sq m ([address]       No past surgical history on file.     Family History   Problem Relation Age of Onset    High Blood Pressure Mother     Diabetes Mother     Lupus Mother          29    Heart Disease Sister Heart Disease Paternal Grandmother     Stroke Paternal Grandmother     Diabetes Maternal Uncle     High Blood Pressure Maternal Grandmother     Diabetes Maternal Grandmother     Stroke Maternal Grandmother     High Blood Pressure Maternal Grandfather     Diabetes Maternal Cousin        Current Outpatient Medications on File Prior to Visit   Medication Sig Dispense Refill    metoprolol succinate (TOPROL XL) 25 MG extended release tablet Take 25 mg by mouth daily      cyanocobalamin ([facility] VITAMIN B12) 1000 MCG tablet Take 1 tablet by mouth daily 90 tablet 3    losartan (COZAAR) 50 MG tablet Take 50 mg by mouth daily      Multiple Vitamins-Minerals (THERAPEUTIC MULTIVITAMIN-MINERALS) tablet Take 1 tablet by mouth daily With Vit D      Blood Pressure Monitoring (B-D ASSURE BPM/DELUXE ARM CUFF) MISC Use as directed for blood pressure monitoring. 1 each 0    aspirin 81 MG chewable tablet Take 1 tablet by mouth daily 30 tablet 0    Chlorpheniramine-APAP (CORICIDIN) 2-325 MG TABS Take 2 tablets by mouth every 4 hours as needed (Patient not taking: Reported on 3/29/2022)       No current facility-administered medications on file prior to visit. Objective:         Physical Exam  Vitals and nursing note reviewed. Constitutional:       Appearance: He is well-developed. He is morbidly obese. HENT:      Head: Normocephalic and atraumatic. Cardiovascular:      Rate and Rhythm: Normal rate and regular rhythm. Heart sounds: Normal heart sounds, S1 normal and S2 normal.   Pulmonary:      Effort: No respiratory distress. Breath sounds: Normal breath sounds. No wheezing or rales. Skin:     General: Skin is warm and dry. Neurological:      Mental Status: He is alert.    Psychiatric:         Speech: Speech normal.         Behavior: Behavior normal.     Vitals:    10/03/22 1615   BP: 118/78   Site: Left Upper Arm   Position: Sitting   Cuff Size: Large Adult   Pulse: 95   Weight: 297 lb (134.7 kg) Height: 6' (1.829 m)     Body mass index is 40.28 kg/m². Wt Readings from Last 3 Encounters:   10/03/22 297 lb (134.7 kg)   03/29/22 (!) 302 lb (137 kg)   01/15/22 300 lb (136.1 kg)     BP Readings from Last 3 Encounters:   10/03/22 118/78   03/29/22 120/80   01/15/22 128/70          No results found for this visit on 10/03/22. The ASCVD Risk score (Carla COFFMAN, et al., 2019) failed to calculate for the following reasons: The valid total cholesterol range is 130 to 320 mg/dL  Lab Review   No visits with results within 2 Month(s) from this visit. Latest known visit with results is:   Office Visit on 03/29/2022   Component Date Value    Sodium 03/29/2022 140     Potassium 03/29/2022 5.1     Chloride 03/29/2022 102     CO2 03/29/2022 23     Anion Gap 03/29/2022 15     Glucose 03/29/2022 83     BUN 03/29/2022 13     Creatinine 03/29/2022 1.0     GFR Non- 03/29/2022 >60     GFR  03/29/2022 >60     Calcium 03/29/2022 9.7     Total Protein 03/29/2022 7.4     Albumin 03/29/2022 5.2 (A)     Albumin/Globulin Ratio 03/29/2022 2.4 (A)     Total Bilirubin 03/29/2022 1.1 (A)     Alkaline Phosphatase 03/29/2022 102     ALT 03/29/2022 42 (A)     AST 03/29/2022 26     Cholesterol, Total 03/29/2022 125     Triglycerides 03/29/2022 201 (A)     HDL 03/29/2022 44     LDL Calculated 03/29/2022 41     VLDL Cholesterol Calcula* 03/29/2022 40     Vitamin B-12 03/29/2022 <150 (A)            Assessment:       Diagnosis Orders   1. Essential hypertension  amLODIPine (NORVASC) 10 MG tablet      2. Adult BMI 40.0-44.9 kg/sq m (HCC)  Carboxymeth-Cellulose-CitricAc (PLENITY) CAPS      3. High triglycerides  atorvastatin (LIPITOR) 80 MG tablet      4. Erectile dysfunction, unspecified erectile dysfunction type  sildenafil (VIAGRA) 100 MG tablet      5. Gastroesophageal reflux disease without esophagitis  famotidine (PEPCID) 40 MG tablet      6. Alcohol abuse        7.  Screen for colon cancer Plan:      Hypertension stable. Continue amlodipine    Concerned about patient's obesity. We will start Plenity. GERD stable with Pepcid continue taking. Concerned about his continued alcohol abuse. I have urged him to cut down. He will think about it. Patient is due for colon cancer screening. .  Is preferring to have stool testing done rather than get a colonoscopy. The patient does not have a family history of colon cancer and does not have bleeding when they have bowel movements. Therefore, fit test  was given to patient. The patient will be contacted in 2 weeks if the fit test has not been returned. If the fit test is positive, then the patient will be referred for a colonoscopy.          Return in about 5 months (around 3/3/2023) for F.card/ MA 1-2 weeks (patient has packet at home), HTN, Rachel Abbasi, [facility]

## 2022-10-26 ENCOUNTER — NURSE ONLY (OUTPATIENT)
Dept: FAMILY MEDICINE CLINIC | Age: 47
End: 2022-10-26
Payer: COMMERCIAL

## 2022-10-26 DIAGNOSIS — Z12.11 COLON CANCER SCREENING: Primary | ICD-10-CM

## 2022-10-26 LAB
CONTROL: ABNORMAL
HEMOCCULT STL QL: POSITIVE

## 2022-10-26 PROCEDURE — 99211 OFF/OP EST MAY X REQ PHY/QHP: CPT | Performed by: FAMILY MEDICINE

## 2022-10-26 PROCEDURE — 82274 ASSAY TEST FOR BLOOD FECAL: CPT | Performed by: FAMILY MEDICINE

## 2022-10-27 PROBLEM — R19.5 POSITIVE FIT (FECAL IMMUNOCHEMICAL TEST): Status: ACTIVE | Noted: 2022-10-27

## 2022-10-28 DIAGNOSIS — R19.5 POSITIVE FIT (FECAL IMMUNOCHEMICAL TEST): Primary | ICD-10-CM

## 2023-03-09 ENCOUNTER — OFFICE VISIT (OUTPATIENT)
Dept: FAMILY MEDICINE CLINIC | Age: 48
End: 2023-03-09
Payer: COMMERCIAL

## 2023-03-09 VITALS
WEIGHT: 300 LBS | HEIGHT: 72 IN | HEART RATE: 90 BPM | BODY MASS INDEX: 40.63 KG/M2 | DIASTOLIC BLOOD PRESSURE: 78 MMHG | SYSTOLIC BLOOD PRESSURE: 120 MMHG

## 2023-03-09 DIAGNOSIS — I10 ESSENTIAL HYPERTENSION: Primary | ICD-10-CM

## 2023-03-09 DIAGNOSIS — F10.10 ALCOHOL ABUSE: ICD-10-CM

## 2023-03-09 DIAGNOSIS — E78.1 HIGH TRIGLYCERIDES: ICD-10-CM

## 2023-03-09 DIAGNOSIS — E53.8 B12 DEFICIENCY: ICD-10-CM

## 2023-03-09 DIAGNOSIS — Z13.1 SCREENING FOR DIABETES MELLITUS: ICD-10-CM

## 2023-03-09 DIAGNOSIS — K76.0 FATTY LIVER: ICD-10-CM

## 2023-03-09 DIAGNOSIS — K21.9 GASTROESOPHAGEAL REFLUX DISEASE WITHOUT ESOPHAGITIS: ICD-10-CM

## 2023-03-09 LAB
A/G RATIO: 1.9 (ref 1.1–2.2)
ALBUMIN SERPL-MCNC: 4.7 G/DL (ref 3.4–5)
ALP BLD-CCNC: 89 U/L (ref 40–129)
ALT SERPL-CCNC: 38 U/L (ref 10–40)
ANION GAP SERPL CALCULATED.3IONS-SCNC: 13 MMOL/L (ref 3–16)
AST SERPL-CCNC: 24 U/L (ref 15–37)
BASOPHILS ABSOLUTE: 0 K/UL (ref 0–0.2)
BASOPHILS RELATIVE PERCENT: 1.1 %
BILIRUB SERPL-MCNC: 0.8 MG/DL (ref 0–1)
BUN BLDV-MCNC: 14 MG/DL (ref 7–20)
CALCIUM SERPL-MCNC: 9.4 MG/DL (ref 8.3–10.6)
CHLORIDE BLD-SCNC: 102 MMOL/L (ref 99–110)
CHOLESTEROL, TOTAL: 139 MG/DL (ref 0–199)
CO2: 20 MMOL/L (ref 21–32)
CREAT SERPL-MCNC: 1 MG/DL (ref 0.9–1.3)
EOSINOPHILS ABSOLUTE: 0.1 K/UL (ref 0–0.6)
EOSINOPHILS RELATIVE PERCENT: 1.8 %
GFR SERPL CREATININE-BSD FRML MDRD: >60 ML/MIN/{1.73_M2}
GLUCOSE BLD-MCNC: 100 MG/DL (ref 70–99)
HCT VFR BLD CALC: 40.4 % (ref 40.5–52.5)
HDLC SERPL-MCNC: 39 MG/DL (ref 40–60)
HEMOGLOBIN: 13.1 G/DL (ref 13.5–17.5)
LDL CHOLESTEROL CALCULATED: ABNORMAL MG/DL
LDL CHOLESTEROL DIRECT: 45 MG/DL
LYMPHOCYTES ABSOLUTE: 1.5 K/UL (ref 1–5.1)
LYMPHOCYTES RELATIVE PERCENT: 37 %
MCH RBC QN AUTO: 27.7 PG (ref 26–34)
MCHC RBC AUTO-ENTMCNC: 32.3 G/DL (ref 31–36)
MCV RBC AUTO: 85.6 FL (ref 80–100)
MONOCYTES ABSOLUTE: 0.3 K/UL (ref 0–1.3)
MONOCYTES RELATIVE PERCENT: 6.5 %
NEUTROPHILS ABSOLUTE: 2.2 K/UL (ref 1.7–7.7)
NEUTROPHILS RELATIVE PERCENT: 53.6 %
PDW BLD-RTO: 12.7 % (ref 12.4–15.4)
PLATELET # BLD: 245 K/UL (ref 135–450)
PMV BLD AUTO: 9.7 FL (ref 5–10.5)
POTASSIUM SERPL-SCNC: 3.9 MMOL/L (ref 3.5–5.1)
RBC # BLD: 4.72 M/UL (ref 4.2–5.9)
SODIUM BLD-SCNC: 135 MMOL/L (ref 136–145)
TOTAL PROTEIN: 7.2 G/DL (ref 6.4–8.2)
TRIGL SERPL-MCNC: 312 MG/DL (ref 0–150)
TSH REFLEX: 1.92 UIU/ML (ref 0.27–4.2)
VLDLC SERPL CALC-MCNC: ABNORMAL MG/DL
WBC # BLD: 4.2 K/UL (ref 4–11)

## 2023-03-09 PROCEDURE — 36415 COLL VENOUS BLD VENIPUNCTURE: CPT | Performed by: FAMILY MEDICINE

## 2023-03-09 PROCEDURE — 3074F SYST BP LT 130 MM HG: CPT | Performed by: FAMILY MEDICINE

## 2023-03-09 PROCEDURE — 99213 OFFICE O/P EST LOW 20 MIN: CPT | Performed by: FAMILY MEDICINE

## 2023-03-09 PROCEDURE — 3078F DIAST BP <80 MM HG: CPT | Performed by: FAMILY MEDICINE

## 2023-03-09 RX ORDER — FAMOTIDINE 40 MG/1
40 TABLET, FILM COATED ORAL 2 TIMES DAILY
Qty: 180 TABLET | Refills: 1 | Status: SHIPPED | OUTPATIENT
Start: 2023-03-09

## 2023-03-09 RX ORDER — ATORVASTATIN CALCIUM 80 MG/1
80 TABLET, FILM COATED ORAL DAILY
Qty: 90 TABLET | Refills: 1 | Status: SHIPPED | OUTPATIENT
Start: 2023-03-09

## 2023-03-09 RX ORDER — AMLODIPINE BESYLATE 10 MG/1
10 TABLET ORAL DAILY
Qty: 90 TABLET | Refills: 1 | Status: SHIPPED | OUTPATIENT
Start: 2023-03-09

## 2023-03-09 ASSESSMENT — PATIENT HEALTH QUESTIONNAIRE - PHQ9
SUM OF ALL RESPONSES TO PHQ QUESTIONS 1-9: 0
2. FEELING DOWN, DEPRESSED OR HOPELESS: 0
SUM OF ALL RESPONSES TO PHQ QUESTIONS 1-9: 0
SUM OF ALL RESPONSES TO PHQ9 QUESTIONS 1 & 2: 0
SUM OF ALL RESPONSES TO PHQ QUESTIONS 1-9: 0
1. LITTLE INTEREST OR PLEASURE IN DOING THINGS: 0
SUM OF ALL RESPONSES TO PHQ QUESTIONS 1-9: 0

## 2023-03-09 ASSESSMENT — ENCOUNTER SYMPTOMS
ABDOMINAL PAIN: 0
SHORTNESS OF BREATH: 0
COUGH: 0

## 2023-03-09 NOTE — PROGRESS NOTES
Patient ID: Josemanuel Cloud 1975    . Chief Complaint   Patient presents with    Hypertension    Gastroesophageal Reflux         Hypertension  This is a chronic problem. The current episode started more than 1 month ago. The problem is controlled. Pertinent negatives include no chest pain, headaches, palpitations or shortness of breath. Past treatments include calcium channel blockers. The current treatment provides significant improvement. Compliance problems include psychosocial issues and diet. Hyperlipidemia  This is a chronic problem. The current episode started more than 1 year ago. The problem is uncontrolled. Pertinent negatives include no chest pain, myalgias or shortness of breath. Current antihyperlipidemic treatment includes statins. Compliance problems include adherence to exercise. Alcohol Problem  Primary symptoms comment: consumes wine. not interested in treatment. Suspected agents: 1 bottle of wine per day. Pertinent negatives include no fever. Gastroesophageal Reflux  He reports no abdominal pain, no chest pain or no coughing (no unusual). consumes wine. not interested in treatment. He has tried a histamine-2 antagonist for the symptoms. Erectile Dysfunction  This is a chronic problem. Past treatments include sildenafil. The treatment provided significant relief. Patient Active Problem List   Diagnosis    Allergic rhinitis    Bilateral varicoceles    Hematuria    Erectile dysfunction    Essential hypertension    Iron deficiency anemia    B12 deficiency    Low testosterone level in male    Fatty liver    Alcohol abuse    Mild concentric left ventricular hypertrophy (LVH)    Diastolic dysfunction    Adult BMI 40.0-44.9 kg/sq m (HCC)    Positive FIT (fecal immunochemical test)       No past surgical history on file.     Family History   Problem Relation Age of Onset    High Blood Pressure Mother     Diabetes Mother     Lupus Mother          29    Heart Disease Sister Heart Disease Paternal Grandmother     Stroke Paternal Grandmother     Diabetes Maternal Uncle     High Blood Pressure Maternal Grandmother     Diabetes Maternal Grandmother     Stroke Maternal Grandmother     High Blood Pressure Maternal Grandfather     Diabetes Maternal Cousin        Current Outpatient Medications on File Prior to Visit   Medication Sig Dispense Refill    metoprolol succinate (TOPROL XL) 25 MG extended release tablet Take 25 mg by mouth daily      sildenafil (VIAGRA) 100 MG tablet Take 1 tablet by mouth as needed for Erectile Dysfunction 10 tablet 11    losartan (COZAAR) 50 MG tablet Take 50 mg by mouth daily      Multiple Vitamins-Minerals (THERAPEUTIC MULTIVITAMIN-MINERALS) tablet Take 1 tablet by mouth daily With Vit D      Blood Pressure Monitoring (B-D ASSURE BPM/DELUXE ARM CUFF) MISC Use as directed for blood pressure monitoring. 1 each 0    aspirin 81 MG chewable tablet Take 1 tablet by mouth daily 30 tablet 0    Carboxymeth-Cellulose-CitricAc (PLENITY) CAPS Take 3 capsules by mouth daily (before lunch) AND 3 capsules daily (before dinner). (Patient not taking: Reported on 3/9/2023) 180 capsule 11    Chlorpheniramine-APAP (CORICIDIN) 2-325 MG TABS Take 2 tablets by mouth every 4 hours as needed (Patient not taking: Reported on 3/29/2022)       No current facility-administered medications on file prior to visit. Objective:         Physical Exam  Vitals and nursing note reviewed. Constitutional:       Appearance: He is well-developed. He is morbidly obese. HENT:      Head: Normocephalic and atraumatic. Cardiovascular:      Rate and Rhythm: Normal rate and regular rhythm. Heart sounds: Normal heart sounds, S1 normal and S2 normal.   Pulmonary:      Effort: No respiratory distress. Breath sounds: Normal breath sounds. No wheezing or rales. Musculoskeletal:      Right lower leg: No edema. Left lower leg: No edema.    Skin:     General: Skin is warm and dry.   Neurological:      Mental Status: He is alert. Psychiatric:         Speech: Speech normal.         Behavior: Behavior normal.     Vitals:    03/09/23 0825   BP: 120/78   Site: Left Upper Arm   Position: Sitting   Cuff Size: Large Adult   Pulse: 90   Weight: 300 lb (136.1 kg)   Height: 6' (1.829 m)     Body mass index is 40.69 kg/m². Wt Readings from Last 3 Encounters:   03/09/23 300 lb (136.1 kg)   10/03/22 297 lb (134.7 kg)   03/29/22 (!) 302 lb (137 kg)     BP Readings from Last 3 Encounters:   03/09/23 120/78   10/03/22 118/78   03/29/22 120/80          No results found for this visit on 03/09/23. The ASCVD Risk score (Carla COFFMAN, et al., 2019) failed to calculate for the following reasons: The valid total cholesterol range is 130 to 320 mg/dL  Lab Review   No visits with results within 2 Month(s) from this visit. Latest known visit with results is:   Nurse Only on 10/26/2022   Component Date Value    Occult Blood Fecal 10/26/2022 positive            Assessment:       Diagnosis Orders   1. Essential hypertension  Comprehensive Metabolic Panel    Lipid Panel    Microalbumin / Creatinine Urine Ratio    Hemoglobin A1C    TSH with Reflex    amLODIPine (NORVASC) 10 MG tablet      2. Fatty liver  Comprehensive Metabolic Panel    Lipid Panel    Microalbumin / Creatinine Urine Ratio    Hemoglobin A1C    TSH with Reflex      3. Screening for diabetes mellitus  Hemoglobin A1C      4. Alcohol abuse  CBC with Auto Differential      5. High triglycerides  atorvastatin (LIPITOR) 80 MG tablet      6. B12 deficiency  cyanocobalamin ([facility] VITAMIN B12) 1000 MCG tablet      7. Gastroesophageal reflux disease without esophagitis  famotidine (PEPCID) 40 MG tablet              Plan:      Hypertension stable. Continue the amlodipine. Recommend he quit drinking. He is declining medication treatment for this. Recheck in 6 months. Return in about 6 months (around 9/9/2023) for HTN, Hyperlipid.

## 2023-03-10 LAB
ESTIMATED AVERAGE GLUCOSE: 85.3 MG/DL
HBA1C MFR BLD: 4.6 %

## 2023-03-16 ENCOUNTER — APPOINTMENT (OUTPATIENT)
Dept: GENERAL RADIOLOGY | Age: 48
End: 2023-03-16
Payer: COMMERCIAL

## 2023-03-16 ENCOUNTER — HOSPITAL ENCOUNTER (EMERGENCY)
Age: 48
Discharge: HOME | End: 2023-03-16
Payer: COMMERCIAL

## 2023-03-16 VITALS
BODY MASS INDEX: 40.63 KG/M2 | OXYGEN SATURATION: 98 % | TEMPERATURE: 98 F | SYSTOLIC BLOOD PRESSURE: 143 MMHG | DIASTOLIC BLOOD PRESSURE: 79 MMHG | HEIGHT: 72 IN | RESPIRATION RATE: 16 BRPM | WEIGHT: 300 LBS | HEART RATE: 80 BPM

## 2023-03-16 DIAGNOSIS — R00.2 HEART PALPITATIONS: Primary | ICD-10-CM

## 2023-03-16 DIAGNOSIS — R06.02 SHORTNESS OF BREATH: ICD-10-CM

## 2023-03-16 LAB
ALBUMIN SERPL-MCNC: 4.6 GM/DL (ref 3.4–5)
ALP BLD-CCNC: 84 IU/L (ref 40–129)
ALT SERPL-CCNC: 38 U/L (ref 10–40)
ANION GAP SERPL CALCULATED.3IONS-SCNC: 11 MMOL/L (ref 4–16)
AST SERPL-CCNC: 25 IU/L (ref 15–37)
BASOPHILS ABSOLUTE: 0 K/CU MM
BASOPHILS RELATIVE PERCENT: 0.8 % (ref 0–1)
BILIRUB SERPL-MCNC: 0.9 MG/DL (ref 0–1)
BUN SERPL-MCNC: 14 MG/DL (ref 6–23)
CALCIUM SERPL-MCNC: 9 MG/DL (ref 8.3–10.6)
CHLORIDE BLD-SCNC: 105 MMOL/L (ref 99–110)
CO2: 22 MMOL/L (ref 21–32)
CREAT SERPL-MCNC: 0.9 MG/DL (ref 0.9–1.3)
D DIMER: <200 NG/ML(DDU)
DIFFERENTIAL TYPE: ABNORMAL
EOSINOPHILS ABSOLUTE: 0.1 K/CU MM
EOSINOPHILS RELATIVE PERCENT: 1.7 % (ref 0–3)
GFR SERPL CREATININE-BSD FRML MDRD: >60 ML/MIN/1.73M2
GLUCOSE SERPL-MCNC: 96 MG/DL (ref 70–99)
HCT VFR BLD CALC: 41.1 % (ref 42–52)
HEMOGLOBIN: 13.1 GM/DL (ref 13.5–18)
IMMATURE NEUTROPHIL %: 0.2 % (ref 0–0.43)
LYMPHOCYTES ABSOLUTE: 1.8 K/CU MM
LYMPHOCYTES RELATIVE PERCENT: 38.3 % (ref 24–44)
MAGNESIUM: 2.2 MG/DL (ref 1.8–2.4)
MCH RBC QN AUTO: 27.6 PG (ref 27–31)
MCHC RBC AUTO-ENTMCNC: 31.9 % (ref 32–36)
MCV RBC AUTO: 86.7 FL (ref 78–100)
MONOCYTES ABSOLUTE: 0.3 K/CU MM
MONOCYTES RELATIVE PERCENT: 5.5 % (ref 0–4)
NUCLEATED RBC %: 0 %
PDW BLD-RTO: 12 % (ref 11.7–14.9)
PLATELET # BLD: 275 K/CU MM (ref 140–440)
PMV BLD AUTO: 10.5 FL (ref 7.5–11.1)
POTASSIUM SERPL-SCNC: 3.9 MMOL/L (ref 3.5–5.1)
PRO-BNP: 29.17 PG/ML
RBC # BLD: 4.74 M/CU MM (ref 4.6–6.2)
SARS-COV-2 RDRP RESP QL NAA+PROBE: NOT DETECTED
SEGMENTED NEUTROPHILS ABSOLUTE COUNT: 2.5 K/CU MM
SEGMENTED NEUTROPHILS RELATIVE PERCENT: 53.5 % (ref 36–66)
SODIUM BLD-SCNC: 138 MMOL/L (ref 135–145)
SOURCE: NORMAL
TOTAL IMMATURE NEUTOROPHIL: 0.01 K/CU MM
TOTAL NUCLEATED RBC: 0 K/CU MM
TOTAL PROTEIN: 7 GM/DL (ref 6.4–8.2)
TROPONIN T: <0.01 NG/ML
TROPONIN T: <0.01 NG/ML
TSH SERPL DL<=0.005 MIU/L-ACNC: 1.45 UIU/ML (ref 0.27–4.2)
WBC # BLD: 4.8 K/CU MM (ref 4–10.5)

## 2023-03-16 PROCEDURE — 99285 EMERGENCY DEPT VISIT HI MDM: CPT

## 2023-03-16 PROCEDURE — 85379 FIBRIN DEGRADATION QUANT: CPT

## 2023-03-16 PROCEDURE — 83880 ASSAY OF NATRIURETIC PEPTIDE: CPT

## 2023-03-16 PROCEDURE — 80053 COMPREHEN METABOLIC PANEL: CPT

## 2023-03-16 PROCEDURE — 84443 ASSAY THYROID STIM HORMONE: CPT

## 2023-03-16 PROCEDURE — 84484 ASSAY OF TROPONIN QUANT: CPT

## 2023-03-16 PROCEDURE — 83735 ASSAY OF MAGNESIUM: CPT

## 2023-03-16 PROCEDURE — 71045 X-RAY EXAM CHEST 1 VIEW: CPT

## 2023-03-16 PROCEDURE — 85025 COMPLETE CBC W/AUTO DIFF WBC: CPT

## 2023-03-16 PROCEDURE — 93005 ELECTROCARDIOGRAM TRACING: CPT | Performed by: NURSE PRACTITIONER

## 2023-03-16 PROCEDURE — 87635 SARS-COV-2 COVID-19 AMP PRB: CPT

## 2023-03-16 NOTE — Clinical Note
Claus Acevedo was seen and treated in our emergency department on 3/16/2023. He may return to work on 03/19/2023. If you have any questions or concerns, please don't hesitate to call.       Mellisa Leary, [place] - CNP

## 2023-03-16 NOTE — DISCHARGE INSTRUCTIONS
Maintain hydration. Get good sleep. Limit caffeine and alcohol. Follow-up with your primary care provider and cardiology within a week, call today to schedule an appointment. Have them recheck your blood pressure was elevated during today's stay.   Return to the emergency department with worsening symptoms or

## 2023-03-16 NOTE — ED PROVIDER NOTES
EMERGENCY DEPARTMENT ENCOUNTER      PCP: Nahomy Kimball MD    CHIEF COMPLAINT    Chief Complaint   Patient presents with    Palpitations     Given 324 ASA    Shortness of Breath     Started at work today         HPI    Luis Segura is a 52 y.o. male alcohol abuse, and left ventricular hypertrophy who presents with complaints of shortness of breath and heart palpitations. The patient states he had an episode earlier this week which lasted a short time and resolved. He states today he was at work and used a wrench and when he stood up he felt lightheaded and then felt palpitations in his chest.  He states he has associated shortness of breath. Nothing alleviates or exacerbates his symptoms. He denies any chest pain, cough, fever, sore throat, abdominal pain, nausea, vomiting, or other complaints. REVIEW OF SYSTEMS    Constitutional:  Denies fever, chills, weight loss or weakness   HENT:  Denies sore throat or ear pain   Cardiovascular:See HPI  Respiratory:  See HPI  GI:  Denies abdominal pain, nausea, vomiting, or diarrhea  :  Denies any urinary symptoms or vaginal symptoms. Musculoskeletal:  Denies back pain  Skin:  Denies rash  Neurologic:  Denies headache, focal weakness or sensory changes   Endocrine:  Denies polyuria or polydypsia   Lymphatic:  Denies swollen glands     All other review of systems are negative  See HPI and nursing notes for additional information     PAST MEDICAL AND SURGICAL HISTORY    Past Medical History:   Diagnosis Date    Allergic rhinitis     Hypertension     Morbid obesity ([address]      No past surgical history on file.     CURRENT MEDICATIONS    Current Outpatient Rx   Medication Sig Dispense Refill    amLODIPine (NORVASC) 10 MG tablet Take 1 tablet by mouth daily 90 tablet 1    atorvastatin (LIPITOR) 80 MG tablet Take 1 tablet by mouth daily 90 tablet 1    cyanocobalamin ([employer] VITAMIN B12) 1000 MCG tablet Take 1 tablet by mouth daily 90 tablet 3    famotidine (PEPCID) 40 MG tablet Take 1 tablet by mouth 2 times daily 180 tablet 1    metoprolol succinate (TOPROL XL) 25 MG extended release tablet Take 25 mg by mouth daily      Carboxymeth-Cellulose-CitricAc (PLENITY) CAPS Take 3 capsules by mouth daily (before lunch) AND 3 capsules daily (before dinner). (Patient not taking: Reported on 3/9/2023) 180 capsule 11    sildenafil (VIAGRA) 100 MG tablet Take 1 tablet by mouth as needed for Erectile Dysfunction 10 tablet 11    losartan (COZAAR) 50 MG tablet Take 50 mg by mouth daily      Chlorpheniramine-APAP (CORICIDIN) 2-325 MG TABS Take 2 tablets by mouth every 4 hours as needed (Patient not taking: Reported on 3/29/2022)      Multiple Vitamins-Minerals (THERAPEUTIC MULTIVITAMIN-MINERALS) tablet Take 1 tablet by mouth daily With Vit D      Blood Pressure Monitoring (B-D ASSURE BPM/DELUXE ARM CUFF) MISC Use as directed for blood pressure monitoring. 1 each 0    aspirin 81 MG chewable tablet Take 1 tablet by mouth daily 30 tablet 0       ALLERGIES    Allergies   Allergen Reactions    Other      ABX Creams      Benzalkonium Chloride Rash    Neosporin [Neomycin-Polymyxin-Gramicidin] Hives, Itching and Rash       SOCIAL AND FAMILY HISTORY    Social History     Socioeconomic History    Marital status:    Tobacco Use    Smoking status: Never    Smokeless tobacco: Never   Substance and Sexual Activity    Alcohol use:  Yes     Alcohol/week: 0.0 standard drinks     Types: 6 - 8 Glasses of wine per week    Drug use: No    Sexual activity: Yes     Partners: Female     Family History   Problem Relation Age of Onset    High Blood Pressure Mother     Diabetes Mother     Lupus Mother          29    Heart Disease Sister     Heart Disease Paternal Grandmother     Stroke Paternal Grandmother     Diabetes Maternal Uncle     High Blood Pressure Maternal Grandmother     Diabetes Maternal Grandmother     Stroke Maternal Grandmother     High Blood Pressure Maternal Grandfather     Diabetes Maternal Cousin          PHYSICAL EXAM    VITAL SIGNS: BP (!) 143/79   Pulse 80   Temp 98 °F (36.7 °C) (Oral)   Resp 16   Ht 6' (1.829 m)   Wt 300 lb (136.1 kg)   SpO2 98%   BMI 40.69 kg/m²    Constitutional:  Well developed, Well nourished. No distress  HENT:  Normocephalic, Atraumatic, PERRL. EOMI. Sclera clear. Conjunctiva normal, No discharge. Neck/Lymphatics: supple, no JVD, no swollen nodes  Cardiovascular:   RRR,  no murmurs/rubs/gallops. No JVD  Respiratory:  Nonlabored breathing. Normal breath sounds, No wheezing  Abdomen: Bowel sounds normal, Soft, No tenderness, no masses. Musculoskeletal:    There is no edema, asymmetry, or calf / thigh tenderness bilaterally. No cyanosis. No cool or pale-appearing limb. Distal cap refill and pulses intact bilateral upper and lower extremities  Bilateral upper and lower extremity ROM intact without pain or obvious deficit  Integument:  Warm, Dry  Neurologic: Alert & oriented , No focal deficits noted. Cranial nerves II through XII grossly intact. Normal gross motor coordination & motor strength bilateral upper and lower extremities  Sensation intact.   Psychiatric:  Affect normal, Mood normal.       Labs:  Results for orders placed or performed during the hospital encounter of 03/16/23   COVID-19, Rapid    Specimen: Nasopharyngeal   Result Value Ref Range    Source NARES     SARS-CoV-2, NAAT NOT DETECTED NOT DETECTED   CBC with Auto Differential   Result Value Ref Range    WBC 4.8 4.0 - 10.5 K/CU MM    RBC 4.74 4.6 - 6.2 M/CU MM    Hemoglobin 13.1 (L) 13.5 - 18.0 GM/DL    Hematocrit 41.1 (L) 42 - 52 %    MCV 86.7 78 - 100 FL    MCH 27.6 27 - 31 PG    MCHC 31.9 (L) 32.0 - 36.0 %    RDW 12.0 11.7 - 14.9 %    Platelets 197 628 - 430 K/CU MM    MPV 10.5 7.5 - 11.1 FL    Differential Type AUTOMATED DIFFERENTIAL     Segs Relative 53.5 36 - 66 %    Lymphocytes % 38.3 24 - 44 %    Monocytes % 5.5 (H) 0 - 4 %    Eosinophils % 1.7 0 - 3 %    Basophils % 0.8 0 - 1 %    Segs Absolute 2.5 K/CU MM    Lymphocytes Absolute 1.8 K/CU MM    Monocytes Absolute 0.3 K/CU MM    Eosinophils Absolute 0.1 K/CU MM    Basophils Absolute 0.0 K/CU MM    Nucleated RBC % 0.0 %    Total Nucleated RBC 0.0 K/CU MM    Total Immature Neutrophil 0.01 K/CU MM    Immature Neutrophil % 0.2 0 - 0.43 %   Comprehensive Metabolic Panel   Result Value Ref Range    Sodium 138 135 - 145 MMOL/L    Potassium 3.9 3.5 - 5.1 MMOL/L    Chloride 105 99 - 110 mMol/L    CO2 22 21 - 32 MMOL/L    BUN 14 6 - 23 MG/DL    Creatinine 0.9 0.9 - 1.3 MG/DL    Est, Glom Filt Rate >60 >60 mL/min/1.73m2    Glucose 96 70 - 99 MG/DL    Calcium 9.0 8.3 - 10.6 MG/DL    Albumin 4.6 3.4 - 5.0 GM/DL    Total Protein 7.0 6.4 - 8.2 GM/DL    Total Bilirubin 0.9 0.0 - 1.0 MG/DL    ALT 38 10 - 40 U/L    AST 25 15 - 37 IU/L    Alkaline Phosphatase 84 40 - 129 IU/L    Anion Gap 11 4 - 16   Troponin   Result Value Ref Range    Troponin T <0.010 <0.01 NG/ML   Brain Natriuretic Peptide   Result Value Ref Range    Pro-BNP 29.17 <300 PG/ML   Magnesium   Result Value Ref Range    Magnesium 2.2 1.8 - 2.4 mg/dl   Troponin   Result Value Ref Range    Troponin T <0.010 <0.01 NG/ML   TSH   Result Value Ref Range    TSH, High Sensitivity 1.450 0.270 - 4.20 uIu/ml   D-Dimer, Quantitative   Result Value Ref Range    D-Dimer, Quant <200 <230 NG/mL(DDU)   EKG 12 Lead   Result Value Ref Range    Ventricular Rate 81 BPM    Atrial Rate 81 BPM    P-R Interval 156 ms    QRS Duration 98 ms    Q-T Interval 392 ms    QTc Calculation (Bazett) 455 ms    P Axis 18 degrees    R Axis -39 degrees    T Axis -21 degrees    Diagnosis       Normal sinus rhythm  Left axis deviation  Incomplete right bundle branch block  Minimal voltage criteria for LVH, may be normal variant  Nonspecific T wave abnormality  Abnormal ECG  When compared with ECG of 19-JAN-2017 18:20,  Incomplete right bundle branch block is now present  Confirmed by Sancho Silva ([id number]) on 3/17/2023 6:08:37 PM             EKG    Sinus rhythm at 81 bpm with incomplete right bundle branch block and nonspecific T wave abnormalities, however does not look significantly different than previous EKGs. Please see emergency department physician note for final interpretation    TELEMETRY:   Placed on cardiac monitor for her palpitations and shortness of breath. Rhythm sinus at 84 bpm.     RADIOLOGY      Non-plain film images such as CT, Ultrasound and MRI are read by the radiologist. CARLTON Duarte CNP have directly visualized the radiologic plain film image(s) with the below findings read by radiologist and agree with interpretation:  XR CHEST PORTABLE   Final Result   No acute process. CC/HPI Summary, DDx, ED Course, and Reassessment: 51-year-old male with history of pretension, alcohol abuse and left ventricular hypertrophy presents emergency department with complaints of shortness of breath, heart palpitations, and feeling lightheaded. He was brought to room and placed on cardiac monitor and continuous pulse oximeter. EKG obtained on arrival and showed a sinus tachycardia. An IV was established and blood work sent to lab. Portable chest x-ray obtained. COVID swab obtained. He was given 1 L of normal saline bolus. History from : Patient    Limitations to history : None    Patient was given the following medications:  Medications - No data to display      Chronic conditions affecting care: Alcohol abuse, left ventricular hypertrophy, hypertension    Discussion with Other Profesionals : None    Social Determinants : None    Records Reviewed : Source previous emergency department visits reviewed. CBC showed anemia which looks baseline for patient. No leukocytosis. CMP did not show any severe electrolyte derangement or transaminitis. Magnesium normal.  COVID is negative. BNP within normal limits. D-dimer normal.  TSH normal.  Troponin x2 negative.   Chest x-ray showed no acute findings. On reassessment, the patient's symptoms improved. I do feel he can be managed outpatient. He was instructed to follow-up with his primary care provider and cardiology and have them recheck his blood pressure was elevated during today's stay. He was instructed to maintain hydration, get plenty of rest, refrain from alcohol and caffeine, and return here with worsening symptoms. He verbalized understanding and agrees with plan of care. Disposition Considerations (tests considered but not done, Shared Decision Making, Pt Expectation of Test or Tx.):   Appropriate for outpatient management      I am the Primary Clinician of Record. Patient agrees to return emergency department if symptoms worsen or any new symptoms develop. Vital signs and nursing notes reviewed during ED course. I have low suspicion for ACS, pneumonia, PE, pneumothorax, hemothorax, electrolyte derangement, factious process, or other emergent condition. Clinical  IMPRESSION    1. Heart palpitations    2. Shortness of breath              Comment: Please note this report has been produced using speech recognition software and may contain errors related to that system including errors in grammar, punctuation, and spelling, as well as words and phrases that may be inappropriate. If there are any questions or concerns please feel free to contact the dictating provider for clarification.        CARLTON Rea - JULISSA  03/17/23 [id number]

## 2023-03-16 NOTE — ED PROVIDER NOTES
I was available for consultation if necessary. FABIENNE saw the patient independently. Please see their note for detailed h&p, plan, and disposition.     12 lead EKG per my interpretation:  Normal sinus rhythm, with right bundle branch block (incomplete)  Rate: 81  QTc is  normal  There are nonspecific T wave changes  There are no ST concerning segment changes    Prior EKG to compare with was available and is the same    (All EKG's are interpreted by myself in the absence of a cardiologist)       Harriet Spencer MD  03/16/23 0038 no

## 2023-03-16 NOTE — ED NOTES
Discharge instructions given. Pt verbalized understanding. Pt ambulated to waiting room.         Mick Bridges RN  03/16/23 [id number]

## 2023-03-16 NOTE — ED TRIAGE NOTES
Pt arrived to the ED with complaints of palpitations and shortness of breath while at work this morning.

## 2023-03-17 LAB
EKG ATRIAL RATE: 81 BPM
EKG DIAGNOSIS: NORMAL
EKG P AXIS: 18 DEGREES
EKG P-R INTERVAL: 156 MS
EKG Q-T INTERVAL: 392 MS
EKG QRS DURATION: 98 MS
EKG QTC CALCULATION (BAZETT): 455 MS
EKG R AXIS: -39 DEGREES
EKG T AXIS: -21 DEGREES
EKG VENTRICULAR RATE: 81 BPM

## 2023-03-17 ASSESSMENT — HEART SCORE: ECG: 0

## 2023-04-27 ENCOUNTER — HOSPITAL ENCOUNTER (OUTPATIENT)
Age: 48
Discharge: HOME | End: 2023-04-27
Payer: COMMERCIAL

## 2023-04-27 LAB
ANION GAP SERPL CALCULATED.3IONS-SCNC: 14 MMOL/L (ref 4–16)
APTT: 32.2 SECONDS (ref 25.1–37.1)
BASOPHILS ABSOLUTE: 0 K/CU MM
BASOPHILS RELATIVE PERCENT: 0.7 % (ref 0–1)
BUN SERPL-MCNC: 12 MG/DL (ref 6–23)
CALCIUM SERPL-MCNC: 9 MG/DL (ref 8.3–10.6)
CHLORIDE BLD-SCNC: 103 MMOL/L (ref 99–110)
CO2: 22 MMOL/L (ref 21–32)
CREAT SERPL-MCNC: 1 MG/DL (ref 0.9–1.3)
DIFFERENTIAL TYPE: ABNORMAL
EOSINOPHILS ABSOLUTE: 0.1 K/CU MM
EOSINOPHILS RELATIVE PERCENT: 0.9 % (ref 0–3)
GFR SERPL CREATININE-BSD FRML MDRD: >60 ML/MIN/1.73M2
GLUCOSE SERPL-MCNC: 80 MG/DL (ref 70–99)
HCT VFR BLD CALC: 39 % (ref 42–52)
HEMOGLOBIN: 12.3 GM/DL (ref 13.5–18)
IMMATURE NEUTROPHIL %: 0 % (ref 0–0.43)
INR BLD: 0.91 INDEX
LYMPHOCYTES ABSOLUTE: 2 K/CU MM
LYMPHOCYTES RELATIVE PERCENT: 37.6 % (ref 24–44)
MCH RBC QN AUTO: 27.2 PG (ref 27–31)
MCHC RBC AUTO-ENTMCNC: 31.5 % (ref 32–36)
MCV RBC AUTO: 86.1 FL (ref 78–100)
MONOCYTES ABSOLUTE: 0.4 K/CU MM
MONOCYTES RELATIVE PERCENT: 7.7 % (ref 0–4)
NUCLEATED RBC %: 0 %
PDW BLD-RTO: 12.5 % (ref 11.7–14.9)
PLATELET # BLD: 284 K/CU MM (ref 140–440)
PMV BLD AUTO: 10.5 FL (ref 7.5–11.1)
POTASSIUM SERPL-SCNC: 3.8 MMOL/L (ref 3.5–5.1)
PROTHROMBIN TIME: 11.6 SECONDS (ref 11.7–14.5)
RBC # BLD: 4.53 M/CU MM (ref 4.6–6.2)
SEGMENTED NEUTROPHILS ABSOLUTE COUNT: 2.8 K/CU MM
SEGMENTED NEUTROPHILS RELATIVE PERCENT: 53.1 % (ref 36–66)
SODIUM BLD-SCNC: 139 MMOL/L (ref 135–145)
TOTAL IMMATURE NEUTOROPHIL: 0 K/CU MM
TOTAL NUCLEATED RBC: 0 K/CU MM
WBC # BLD: 5.3 K/CU MM (ref 4–10.5)

## 2023-04-27 PROCEDURE — 85610 PROTHROMBIN TIME: CPT

## 2023-04-27 PROCEDURE — 85730 THROMBOPLASTIN TIME PARTIAL: CPT

## 2023-04-27 PROCEDURE — 36415 COLL VENOUS BLD VENIPUNCTURE: CPT

## 2023-04-27 PROCEDURE — 85025 COMPLETE CBC W/AUTO DIFF WBC: CPT

## 2023-04-27 PROCEDURE — 80048 BASIC METABOLIC PNL TOTAL CA: CPT

## 2023-09-14 ENCOUNTER — OFFICE VISIT (OUTPATIENT)
Dept: FAMILY MEDICINE CLINIC | Age: 48
End: 2023-09-14
Payer: COMMERCIAL

## 2023-09-14 VITALS
BODY MASS INDEX: 41.42 KG/M2 | OXYGEN SATURATION: 98 % | SYSTOLIC BLOOD PRESSURE: 121 MMHG | HEART RATE: 80 BPM | DIASTOLIC BLOOD PRESSURE: 76 MMHG | WEIGHT: 305.4 LBS

## 2023-09-14 DIAGNOSIS — K21.9 GASTROESOPHAGEAL REFLUX DISEASE WITHOUT ESOPHAGITIS: ICD-10-CM

## 2023-09-14 DIAGNOSIS — I10 ESSENTIAL HYPERTENSION: Primary | ICD-10-CM

## 2023-09-14 DIAGNOSIS — Z23 FLU VACCINE NEED: ICD-10-CM

## 2023-09-14 DIAGNOSIS — K76.0 FATTY LIVER: ICD-10-CM

## 2023-09-14 DIAGNOSIS — N52.9 ERECTILE DYSFUNCTION, UNSPECIFIED ERECTILE DYSFUNCTION TYPE: ICD-10-CM

## 2023-09-14 DIAGNOSIS — R19.5 POSITIVE FIT (FECAL IMMUNOCHEMICAL TEST): ICD-10-CM

## 2023-09-14 DIAGNOSIS — E78.1 HIGH TRIGLYCERIDES: ICD-10-CM

## 2023-09-14 PROCEDURE — 3074F SYST BP LT 130 MM HG: CPT | Performed by: FAMILY MEDICINE

## 2023-09-14 PROCEDURE — 3078F DIAST BP <80 MM HG: CPT | Performed by: FAMILY MEDICINE

## 2023-09-14 PROCEDURE — 90471 IMMUNIZATION ADMIN: CPT | Performed by: FAMILY MEDICINE

## 2023-09-14 PROCEDURE — 99214 OFFICE O/P EST MOD 30 MIN: CPT | Performed by: FAMILY MEDICINE

## 2023-09-14 PROCEDURE — 90674 CCIIV4 VAC NO PRSV 0.5 ML IM: CPT | Performed by: FAMILY MEDICINE

## 2023-09-14 RX ORDER — ATORVASTATIN CALCIUM 80 MG/1
80 TABLET, FILM COATED ORAL DAILY
Qty: 90 TABLET | Refills: 1 | Status: SHIPPED | OUTPATIENT
Start: 2023-09-14

## 2023-09-14 RX ORDER — FAMOTIDINE 40 MG/1
40 TABLET, FILM COATED ORAL 2 TIMES DAILY
Qty: 180 TABLET | Refills: 1 | Status: SHIPPED | OUTPATIENT
Start: 2023-09-14

## 2023-09-14 RX ORDER — AMLODIPINE BESYLATE 10 MG/1
10 TABLET ORAL DAILY
Qty: 90 TABLET | Refills: 1 | Status: SHIPPED | OUTPATIENT
Start: 2023-09-14

## 2023-09-14 RX ORDER — SILDENAFIL 100 MG/1
100 TABLET, FILM COATED ORAL PRN
Qty: 10 TABLET | Refills: 11 | Status: SHIPPED | OUTPATIENT
Start: 2023-09-14

## 2023-09-14 ASSESSMENT — ENCOUNTER SYMPTOMS
SHORTNESS OF BREATH: 0
COUGH: 0
ABDOMINAL PAIN: 0

## 2023-09-14 NOTE — PROGRESS NOTES
Patient ID: Maday Mis 1975    . Chief Complaint   Patient presents with    Hypertension    Hyperlipidemia         Hypertension  This is a chronic problem. The current episode started more than 1 month ago. The problem is controlled. Pertinent negatives include no chest pain, headaches, palpitations or shortness of breath. Past treatments include calcium channel blockers. The current treatment provides significant improvement. Compliance problems include psychosocial issues and diet. Hyperlipidemia  This is a chronic problem. The current episode started more than 1 year ago. The problem is uncontrolled. Pertinent negatives include no chest pain, myalgias or shortness of breath. Current antihyperlipidemic treatment includes statins. Compliance problems include adherence to exercise. Alcohol Problem  Primary symptoms comment: consumes wine. not interested in treatment. Suspected agents: 1 bottle of wine per day. Pertinent negatives include no fever. Gastroesophageal Reflux  He reports no abdominal pain, no chest pain or no coughing (no unusual). consumes wine. not interested in treatment. He has tried a histamine-2 antagonist for the symptoms. Erectile Dysfunction  This is a chronic problem. Past treatments include sildenafil. The treatment provided significant relief. Patient Active Problem List   Diagnosis    Allergic rhinitis    Bilateral varicoceles    Erectile dysfunction    Essential hypertension    Iron deficiency anemia    B12 deficiency    Low testosterone level in male    High triglycerides    Fatty liver    Alcohol abuse    Mild concentric left ventricular hypertrophy (LVH)    Diastolic dysfunction    Adult BMI 40.0-44.9 kg/sq m (HCC)    Positive FIT (fecal immunochemical test)       No past surgical history on file.     Family History   Problem Relation Age of Onset    High Blood Pressure Mother     Diabetes Mother     Lupus Mother          29    Heart Disease

## 2023-10-26 ENCOUNTER — OFFICE VISIT (OUTPATIENT)
Dept: FAMILY MEDICINE CLINIC | Age: 48
End: 2023-10-26
Payer: COMMERCIAL

## 2023-10-26 VITALS
WEIGHT: 305 LBS | SYSTOLIC BLOOD PRESSURE: 123 MMHG | DIASTOLIC BLOOD PRESSURE: 74 MMHG | HEART RATE: 85 BPM | OXYGEN SATURATION: 95 % | BODY MASS INDEX: 41.37 KG/M2

## 2023-10-26 DIAGNOSIS — M54.50 ACUTE BILATERAL LOW BACK PAIN WITHOUT SCIATICA: Primary | ICD-10-CM

## 2023-10-26 PROCEDURE — 3078F DIAST BP <80 MM HG: CPT | Performed by: FAMILY MEDICINE

## 2023-10-26 PROCEDURE — 3074F SYST BP LT 130 MM HG: CPT | Performed by: FAMILY MEDICINE

## 2023-10-26 PROCEDURE — 99213 OFFICE O/P EST LOW 20 MIN: CPT | Performed by: FAMILY MEDICINE

## 2023-10-26 RX ORDER — METOPROLOL SUCCINATE 50 MG/1
50 TABLET, EXTENDED RELEASE ORAL DAILY
COMMUNITY

## 2023-10-26 NOTE — PROGRESS NOTES
Patient ID: Fay Garcia 1975    . Chief Complaint   Patient presents with    Lower Back Pain     Little over a week now    when he drinks energy drinks he gets flank pain          HPI    Low back pain: lower back and into thigh. More on the right side. Has been taking ibuprofen which helps. No numbness, weakness,  or tingling. Pain is 7-8/ 10. Worse after drinking energy drinks or pop. Worse with standing all day at work. Ongoing for a week. Denies any loss of control of urine or stools. No difficulty getting urine started. No blood in the urine. Feels tightness in right flank      Patient Active Problem List   Diagnosis    Allergic rhinitis    Bilateral varicoceles    Erectile dysfunction    Essential hypertension    Iron deficiency anemia    B12 deficiency    Low testosterone level in male    High triglycerides    Fatty liver    Alcohol abuse    Mild concentric left ventricular hypertrophy (LVH)    Diastolic dysfunction    Adult BMI 40.0-44.9 kg/sq m (HCC)    Positive FIT (fecal immunochemical test)       No past surgical history on file.     Family History   Problem Relation Age of Onset    High Blood Pressure Mother     Diabetes Mother     Lupus Mother          29    Heart Disease Sister     Heart Disease Paternal Grandmother     Stroke Paternal Grandmother     Diabetes Maternal Uncle     High Blood Pressure Maternal Grandmother     Diabetes Maternal Grandmother     Stroke Maternal Grandmother     High Blood Pressure Maternal Grandfather     Diabetes Maternal Cousin        Current Outpatient Medications on File Prior to Visit   Medication Sig Dispense Refill    metoprolol succinate (TOPROL XL) 50 MG extended release tablet Take 1 tablet by mouth daily      amLODIPine (NORVASC) 10 MG tablet Take 1 tablet by mouth daily 90 tablet 1    famotidine (PEPCID) 40 MG tablet Take 1 tablet by mouth 2 times daily 180 tablet 1    sildenafil (VIAGRA) 100 MG tablet Take 1 tablet by mouth as needed

## 2024-02-29 ENCOUNTER — OFFICE VISIT (OUTPATIENT)
Dept: FAMILY MEDICINE CLINIC | Age: 49
End: 2024-02-29

## 2024-02-29 VITALS
OXYGEN SATURATION: 96 % | TEMPERATURE: 98.3 F | DIASTOLIC BLOOD PRESSURE: 76 MMHG | SYSTOLIC BLOOD PRESSURE: 122 MMHG | HEART RATE: 88 BPM

## 2024-02-29 DIAGNOSIS — R05.1 ACUTE COUGH: Primary | ICD-10-CM

## 2024-02-29 LAB — SARS-COV-2: NORMAL

## 2024-02-29 SDOH — ECONOMIC STABILITY: INCOME INSECURITY: HOW HARD IS IT FOR YOU TO PAY FOR THE VERY BASICS LIKE FOOD, HOUSING, MEDICAL CARE, AND HEATING?: SOMEWHAT HARD

## 2024-02-29 SDOH — ECONOMIC STABILITY: FOOD INSECURITY: WITHIN THE PAST 12 MONTHS, YOU WORRIED THAT YOUR FOOD WOULD RUN OUT BEFORE YOU GOT MONEY TO BUY MORE.: NEVER TRUE

## 2024-02-29 SDOH — ECONOMIC STABILITY: HOUSING INSECURITY
IN THE LAST 12 MONTHS, WAS THERE A TIME WHEN YOU DID NOT HAVE A STEADY PLACE TO SLEEP OR SLEPT IN A SHELTER (INCLUDING NOW)?: NO

## 2024-02-29 ASSESSMENT — PATIENT HEALTH QUESTIONNAIRE - PHQ9
SUM OF ALL RESPONSES TO PHQ QUESTIONS 1-9: 0
SUM OF ALL RESPONSES TO PHQ QUESTIONS 1-9: 0
2. FEELING DOWN, DEPRESSED OR HOPELESS: 0
SUM OF ALL RESPONSES TO PHQ9 QUESTIONS 1 & 2: 0
SUM OF ALL RESPONSES TO PHQ QUESTIONS 1-9: 0
1. LITTLE INTEREST OR PLEASURE IN DOING THINGS: 0
SUM OF ALL RESPONSES TO PHQ QUESTIONS 1-9: 0

## 2024-02-29 ASSESSMENT — ENCOUNTER SYMPTOMS: COUGH: 1

## 2024-02-29 NOTE — PROGRESS NOTES
Patient ID: Elia BRITTON Stephani 1975    Chief Complaint   Patient presents with    Cough     X 4 days     Chest Congestion     Green phlegm          Cough    Chest Congestion  Associated symptoms include congestion and coughing.       Cough: X 4 days.  Coughing up mucus.  Ears hurt.  No fever no chills.  Some mild sweats.  Itchy throat.  Has been trying Mucinex.  No ill contacts.      Patient Active Problem List   Diagnosis    Allergic rhinitis    Bilateral varicoceles    Erectile dysfunction    Essential hypertension    Iron deficiency anemia    B12 deficiency    Low testosterone level in male    High triglycerides    Fatty liver    Alcohol abuse    Mild concentric left ventricular hypertrophy (LVH)    Diastolic dysfunction    Adult BMI 40.0-44.9 kg/sq m (HCC)    Positive FIT (fecal immunochemical test)       No past surgical history on file.    Family History   Problem Relation Age of Onset    High Blood Pressure Mother     Diabetes Mother     Lupus Mother          34    Heart Disease Sister     Heart Disease Paternal Grandmother     Stroke Paternal Grandmother     Diabetes Maternal Uncle     High Blood Pressure Maternal Grandmother     Diabetes Maternal Grandmother     Stroke Maternal Grandmother     High Blood Pressure Maternal Grandfather     Diabetes Maternal Cousin        Current Outpatient Medications on File Prior to Visit   Medication Sig Dispense Refill    metoprolol succinate (TOPROL XL) 50 MG extended release tablet Take 1 tablet by mouth daily      amLODIPine (NORVASC) 10 MG tablet Take 1 tablet by mouth daily 90 tablet 1    famotidine (PEPCID) 40 MG tablet Take 1 tablet by mouth 2 times daily 180 tablet 1    sildenafil (VIAGRA) 100 MG tablet Take 1 tablet by mouth as needed for Erectile Dysfunction 10 tablet 11    atorvastatin (LIPITOR) 80 MG tablet Take 1 tablet by mouth daily 90 tablet 1    cyanocobalamin ([employer] VITAMIN B12) 1000 MCG tablet Take 1 tablet by mouth daily 90 tablet 3

## 2024-04-05 ENCOUNTER — OFFICE VISIT (OUTPATIENT)
Dept: FAMILY MEDICINE CLINIC | Age: 49
End: 2024-04-05
Payer: COMMERCIAL

## 2024-04-05 VITALS
HEIGHT: 72 IN | HEART RATE: 80 BPM | DIASTOLIC BLOOD PRESSURE: 78 MMHG | OXYGEN SATURATION: 97 % | BODY MASS INDEX: 41.72 KG/M2 | WEIGHT: 308 LBS | SYSTOLIC BLOOD PRESSURE: 116 MMHG

## 2024-04-05 DIAGNOSIS — R19.5 POSITIVE FIT (FECAL IMMUNOCHEMICAL TEST): ICD-10-CM

## 2024-04-05 DIAGNOSIS — E78.1 HIGH TRIGLYCERIDES: ICD-10-CM

## 2024-04-05 DIAGNOSIS — K21.9 GASTROESOPHAGEAL REFLUX DISEASE WITHOUT ESOPHAGITIS: ICD-10-CM

## 2024-04-05 DIAGNOSIS — M54.32 LEFT SIDED SCIATICA: ICD-10-CM

## 2024-04-05 DIAGNOSIS — E53.8 B12 DEFICIENCY: ICD-10-CM

## 2024-04-05 DIAGNOSIS — I10 ESSENTIAL HYPERTENSION: Primary | ICD-10-CM

## 2024-04-05 LAB
ALBUMIN SERPL-MCNC: 4.7 G/DL (ref 3.4–5)
ALBUMIN/GLOB SERPL: 2 {RATIO} (ref 1.1–2.2)
ALP SERPL-CCNC: 90 U/L (ref 40–129)
ALT SERPL-CCNC: 36 U/L (ref 10–40)
ANION GAP SERPL CALCULATED.3IONS-SCNC: 15 MMOL/L (ref 3–16)
AST SERPL-CCNC: 23 U/L (ref 15–37)
BILIRUB SERPL-MCNC: 0.6 MG/DL (ref 0–1)
BUN SERPL-MCNC: 13 MG/DL (ref 7–20)
CALCIUM SERPL-MCNC: 9.4 MG/DL (ref 8.3–10.6)
CHLORIDE SERPL-SCNC: 103 MMOL/L (ref 99–110)
CHOLEST SERPL-MCNC: 141 MG/DL (ref 0–199)
CO2 SERPL-SCNC: 22 MMOL/L (ref 21–32)
CREAT SERPL-MCNC: 1 MG/DL (ref 0.9–1.3)
GFR SERPLBLD CREATININE-BSD FMLA CKD-EPI: >90 ML/MIN/{1.73_M2}
GLUCOSE SERPL-MCNC: 105 MG/DL (ref 70–99)
HDLC SERPL-MCNC: 35 MG/DL (ref 40–60)
LDLC SERPL CALC-MCNC: ABNORMAL MG/DL
LDLC SERPL-MCNC: 31 MG/DL
POTASSIUM SERPL-SCNC: 4.2 MMOL/L (ref 3.5–5.1)
PROT SERPL-MCNC: 7 G/DL (ref 6.4–8.2)
SODIUM SERPL-SCNC: 140 MMOL/L (ref 136–145)
TRIGL SERPL-MCNC: 435 MG/DL (ref 0–150)
VLDLC SERPL CALC-MCNC: ABNORMAL MG/DL

## 2024-04-05 PROCEDURE — 3074F SYST BP LT 130 MM HG: CPT | Performed by: FAMILY MEDICINE

## 2024-04-05 PROCEDURE — 36415 COLL VENOUS BLD VENIPUNCTURE: CPT | Performed by: FAMILY MEDICINE

## 2024-04-05 PROCEDURE — 99214 OFFICE O/P EST MOD 30 MIN: CPT | Performed by: FAMILY MEDICINE

## 2024-04-05 PROCEDURE — 3078F DIAST BP <80 MM HG: CPT | Performed by: FAMILY MEDICINE

## 2024-04-05 RX ORDER — FAMOTIDINE 40 MG/1
40 TABLET, FILM COATED ORAL 2 TIMES DAILY
Qty: 180 TABLET | Refills: 1 | Status: SHIPPED | OUTPATIENT
Start: 2024-04-05

## 2024-04-05 RX ORDER — ATORVASTATIN CALCIUM 80 MG/1
80 TABLET, FILM COATED ORAL DAILY
Qty: 90 TABLET | Refills: 1 | Status: SHIPPED | OUTPATIENT
Start: 2024-04-05

## 2024-04-05 RX ORDER — AMLODIPINE BESYLATE 10 MG/1
10 TABLET ORAL DAILY
Qty: 90 TABLET | Refills: 1 | Status: SHIPPED | OUTPATIENT
Start: 2024-04-05

## 2024-04-05 RX ORDER — IBUPROFEN 800 MG/1
800 TABLET ORAL EVERY 8 HOURS PRN
Qty: 40 TABLET | Refills: 1 | Status: SHIPPED | OUTPATIENT
Start: 2024-04-05 | End: 2024-05-15

## 2024-04-05 RX ORDER — TRAMADOL HYDROCHLORIDE 50 MG/1
50 TABLET ORAL EVERY 6 HOURS PRN
Qty: 20 TABLET | Refills: 0 | Status: SHIPPED | OUTPATIENT
Start: 2024-04-05 | End: 2024-04-12

## 2024-04-05 RX ORDER — METOPROLOL SUCCINATE 100 MG/1
100 TABLET, EXTENDED RELEASE ORAL DAILY
COMMUNITY

## 2024-04-05 RX ORDER — TIZANIDINE 4 MG/1
4 TABLET ORAL 3 TIMES DAILY
Qty: 50 TABLET | Refills: 1 | Status: SHIPPED | OUTPATIENT
Start: 2024-04-05

## 2024-04-05 ASSESSMENT — ENCOUNTER SYMPTOMS
COUGH: 0
SHORTNESS OF BREATH: 0
BACK PAIN: 1
ABDOMINAL PAIN: 0
BOWEL INCONTINENCE: 0

## 2024-04-05 NOTE — PROGRESS NOTES
Tobacco smoker: No      Systolic Blood Pressure: 116 mmHg      Is BP treated: Yes      HDL Cholesterol: 39 mg/dL      Total Cholesterol: 139 mg/dL  Lab Review   Lab Results   Component Value Date/Time     04/27/2023 03:24 PM    K 3.8 04/27/2023 03:24 PM     04/27/2023 03:24 PM    CO2 22 04/27/2023 03:24 PM    BUN 12 04/27/2023 03:24 PM    CREATININE 1.0 04/27/2023 03:24 PM    GLUCOSE 80 04/27/2023 03:24 PM    CALCIUM 9.0 04/27/2023 03:24 PM     Lab Results   Component Value Date/Time    CHOL 139 03/09/2023 08:40 AM    TRIG 312 03/09/2023 08:40 AM    HDL 39 03/09/2023 08:40 AM    LDLDIRECT 45 03/09/2023 08:40 AM     Hemoglobin A1C   Date Value Ref Range Status   03/09/2023 4.6 See comment % Final     Comment:     Comment:  Diagnosis of Diabetes: > or = 6.5%  Increased risk of diabetes (Prediabetes): 5.7-6.4%  Glycemic Control: Nonpregnant Adults: <7.0%                    Pregnant: <6.0%       03/04/2021 4.9 See comment % Final     Comment:     Comment:  Diagnosis of Diabetes: > or = 6.5%  Increased risk of diabetes (Prediabetes): 5.7-6.4%  Glycemic Control: Nonpregnant Adults: <7.0%                    Pregnant: <6.0%       09/10/2020 5.0 See comment % Final     Comment:     Comment:  Diagnosis of Diabetes: > or = 6.5%  Increased risk of diabetes (Prediabetes): 5.7-6.4%  Glycemic Control: Nonpregnant Adults: <7.0%                    Pregnant: <6.0%              Assessment:       Diagnosis Orders   1. Essential hypertension  Comprehensive Metabolic Panel    Lipid Panel    amLODIPine (NORVASC) 10 MG tablet      2. Adult BMI 40.0-44.9 kg/sq m (HCC)        3. Positive FIT (fecal immunochemical test)  Amb External Referral To Gastroenterology      4. Left sided sciatica  [facility]    ibuprofen (ADVIL;MOTRIN) 800 MG tablet    tiZANidine (ZANAFLEX) 4 MG tablet    traMADol (ULTRAM) 50 MG tablet      5. High triglycerides  atorvastatin (LIPITOR) 80 MG tablet      6. B12 deficiency

## 2024-04-05 NOTE — PATIENT INSTRUCTIONS
Low Back Pain: Exercises  Your Care Instructions  Here are some examples of typical rehabilitation exercises for your condition. Start each exercise slowly. Ease off the exercise if you start to have pain.  Your doctor or physical therapist will tell you when you can start these exercises and which ones will work best for you.  How to do the exercises  Press-up    Lie on your stomach, supporting your body with your forearms.  Press your elbows down into the floor to raise your upper back. As you do this, relax your stomach muscles and allow your back to arch without using your back muscles. As your press up, do not let your hips or pelvis come off the floor.  Hold for 15 to 30 seconds, then relax.  Repeat 2 to 4 times.     Alternate arm and leg (bird dog) exercise    Start on the floor, on your hands and knees.  Tighten your belly muscles.  Raise one leg off the floor, and hold it straight out behind you. Be careful not to let your hip drop down, because that will twist your trunk.  Hold for about 6 seconds, then lower your leg and switch to the other leg.  Repeat 8 to 12 times on each leg.  Over time, work up to holding for 10 to 30 seconds each time.  If you feel stable and secure with your leg raised, try raising the opposite arm straight out in front of you at the same time.     Knee-to-chest exercise    Lie on your back with your knees bent and your feet flat on the floor.  Bring one knee to your chest, keeping the other foot flat on the floor (or keeping the other leg straight, whichever feels better on your lower back).  Keep your lower back pressed to the floor. Hold for at least 15 to 30 seconds.  Relax, and lower the knee to the starting position.  Repeat with the other leg. Repeat 2 to 4 times with each leg.  To get more stretch, put your other leg flat on the floor while pulling your knee to your chest.     Curl-ups    Lie on the floor on your back with your knees bent at a 90-degree angle. Your feet

## 2024-10-17 ENCOUNTER — OFFICE VISIT (OUTPATIENT)
Dept: FAMILY MEDICINE CLINIC | Age: 49
End: 2024-10-17

## 2024-10-17 VITALS
DIASTOLIC BLOOD PRESSURE: 80 MMHG | BODY MASS INDEX: 41.72 KG/M2 | OXYGEN SATURATION: 97 % | SYSTOLIC BLOOD PRESSURE: 124 MMHG | WEIGHT: 308 LBS | RESPIRATION RATE: 17 BRPM | HEART RATE: 84 BPM | HEIGHT: 72 IN

## 2024-10-17 DIAGNOSIS — E78.1 HIGH TRIGLYCERIDES: ICD-10-CM

## 2024-10-17 DIAGNOSIS — Z23 NEED FOR COVID-19 VACCINE: ICD-10-CM

## 2024-10-17 DIAGNOSIS — K21.9 GASTROESOPHAGEAL REFLUX DISEASE WITHOUT ESOPHAGITIS: ICD-10-CM

## 2024-10-17 DIAGNOSIS — Z23 NEEDS FLU SHOT: ICD-10-CM

## 2024-10-17 DIAGNOSIS — R19.5 POSITIVE FIT (FECAL IMMUNOCHEMICAL TEST): ICD-10-CM

## 2024-10-17 DIAGNOSIS — K76.0 FATTY LIVER: ICD-10-CM

## 2024-10-17 DIAGNOSIS — I10 ESSENTIAL HYPERTENSION: ICD-10-CM

## 2024-10-17 DIAGNOSIS — R06.83 SNORING: ICD-10-CM

## 2024-10-17 DIAGNOSIS — Z23 NEED FOR HEPATITIS B VACCINATION: ICD-10-CM

## 2024-10-17 DIAGNOSIS — F10.10 ALCOHOL ABUSE: ICD-10-CM

## 2024-10-17 DIAGNOSIS — Z00.00 ANNUAL PHYSICAL EXAM: Primary | ICD-10-CM

## 2024-10-17 DIAGNOSIS — N52.9 ERECTILE DYSFUNCTION, UNSPECIFIED ERECTILE DYSFUNCTION TYPE: ICD-10-CM

## 2024-10-17 RX ORDER — AMLODIPINE BESYLATE 10 MG/1
10 TABLET ORAL DAILY
Qty: 90 TABLET | Refills: 1 | Status: SHIPPED | OUTPATIENT
Start: 2024-10-17

## 2024-10-17 RX ORDER — ATORVASTATIN CALCIUM 80 MG/1
80 TABLET, FILM COATED ORAL DAILY
Qty: 90 TABLET | Refills: 1 | Status: SHIPPED | OUTPATIENT
Start: 2024-10-17

## 2024-10-17 RX ORDER — SILDENAFIL 100 MG/1
100 TABLET, FILM COATED ORAL PRN
Qty: 10 TABLET | Refills: 11 | Status: SHIPPED | OUTPATIENT
Start: 2024-10-17

## 2024-10-17 RX ORDER — FAMOTIDINE 40 MG/1
40 TABLET, FILM COATED ORAL 2 TIMES DAILY
Qty: 180 TABLET | Refills: 1 | Status: SHIPPED | OUTPATIENT
Start: 2024-10-17

## 2024-10-17 NOTE — PROGRESS NOTES
No past surgical history on file.  Family History   Problem Relation Age of Onset    High Blood Pressure Mother     Diabetes Mother     Lupus Mother          34    Heart Disease Sister     Heart Disease Paternal Grandmother     Stroke Paternal Grandmother     Diabetes Maternal Uncle     High Blood Pressure Maternal Grandmother     Diabetes Maternal Grandmother     Stroke Maternal Grandmother     High Blood Pressure Maternal Grandfather     Diabetes Maternal Cousin      Lipid panel:    Lab Results   Component Value Date    CHOL 141 2024    TRIG 435 (H) 2024    HDL 35 (L) 2024    LDLDIRECT 31 2024         Physical Exam:   Vitals:    10/17/24 1553   BP: 124/80   Site: Right Upper Arm   Position: Sitting   Cuff Size: Large Adult   Pulse: 84   Resp: 17   SpO2: 97%   Weight: (!) 139.7 kg (308 lb)   Height: 1.829 m (6' 0.01\")     Body mass index is 41.76 kg/m².    Physical Exam           Vitals:    10/17/24 1553   BP: 124/80   Site: Right Upper Arm   Position: Sitting   Cuff Size: Large Adult   Pulse: 84   Resp: 17   SpO2: 97%   Weight: (!) 139.7 kg (308 lb)   Height: 1.829 m (6' 0.01\")     Body mass index is 41.76 kg/m².     Wt Readings from Last 3 Encounters:   10/17/24 (!) 139.7 kg (308 lb)   24 (!) 139.7 kg (308 lb)   10/26/23 (!) 138.3 kg (305 lb)     BP Readings from Last 3 Encounters:   10/17/24 124/80   24 116/78   24 122/76      Lab Review   Lab Results   Component Value Date/Time     2024 08:26 AM    K 4.2 2024 08:26 AM     2024 08:26 AM    CO2 22 2024 08:26 AM    BUN 13 2024 08:26 AM    CREATININE 1.0 2024 08:26 AM    GLUCOSE 105 2024 08:26 AM    CALCIUM 9.4 2024 08:26 AM     Lab Results   Component Value Date/Time    CHOL 141 2024 08:26 AM    TRIG 435 2024 08:26 AM    HDL 35 2024 08:26 AM    LDLDIRECT 31 2024 08:26 AM      Hemoglobin A1C   Date Value Ref Range Status   2023

## 2024-11-18 ENCOUNTER — LAB (OUTPATIENT)
Dept: FAMILY MEDICINE CLINIC | Age: 49
End: 2024-11-18
Payer: COMMERCIAL

## 2024-11-18 DIAGNOSIS — Z23 NEED FOR HEPATITIS B VACCINATION: Primary | ICD-10-CM

## 2024-11-18 PROCEDURE — 90471 IMMUNIZATION ADMIN: CPT | Performed by: FAMILY MEDICINE

## 2024-11-18 PROCEDURE — 90746 HEPB VACCINE 3 DOSE ADULT IM: CPT | Performed by: FAMILY MEDICINE

## 2025-02-07 ENCOUNTER — HOSPITAL ENCOUNTER (OUTPATIENT)
Age: 50
Discharge: HOME | End: 2025-02-07
Payer: COMMERCIAL

## 2025-02-07 LAB
ANION GAP SERPL CALCULATED.3IONS-SCNC: 10 MMOL/L (ref 9–17)
BUN SERPL-MCNC: 15 MG/DL (ref 7–20)
CALCIUM SERPL-MCNC: 9.8 MG/DL (ref 8.3–10.6)
CHLORIDE SERPL-SCNC: 105 MMOL/L (ref 99–110)
CO2 SERPL-SCNC: 24 MMOL/L (ref 21–32)
CREAT SERPL-MCNC: 1.1 MG/DL (ref 0.9–1.3)
GFR, ESTIMATED: 73 ML/MIN/1.73M2
GLUCOSE SERPL-MCNC: 108 MG/DL (ref 74–99)
POTASSIUM SERPL-SCNC: 4.4 MMOL/L (ref 3.5–5.1)
SODIUM SERPL-SCNC: 139 MMOL/L (ref 136–145)

## 2025-02-07 PROCEDURE — 80048 BASIC METABOLIC PNL TOTAL CA: CPT

## 2025-02-25 NOTE — PROGRESS NOTES
Problem: Risk for Fetal Complications  Goal: Fetal well being is maintained  Outcome: Monitoring/Evaluating progress  Goal: Verbalizes understanding of fetal monitoring  Description: Document on Patient Education Activity  Outcome: Monitoring/Evaluating progress     Problem: Risk for Maternal Complications  Goal: Remains free of signs and symptoms of infection  Outcome: Monitoring/Evaluating progress  Goal: Physical assessment maintained within expected parameters  Outcome: Monitoring/Evaluating progress  Goal: Remains free from falls  Outcome: Monitoring/Evaluating progress  Goal: Verbalizes understanding of fall prevention and safety devices  Description: Document on Patient Education Activity  Outcome: Monitoring/Evaluating progress     Problem: Moderate/High Risk for Postpartum Hemorrhage (PPH)  Goal: Hemodynamic stability is maintained  Outcome: Monitoring/Evaluating progress     Problem: Labor  Goal: Patient/family childbirth preparation and goals established  Outcome: Monitoring/Evaluating progress  Goal: Safe delivery of mother and fetus  Outcome: Monitoring/Evaluating progress  Goal: Acceptable labor coping is achieved/maintained  Outcome: Monitoring/Evaluating progress  Goal: Verbalizes understanding of labor process and labor coping interventions  Description: Document on Patient Education Activity  Outcome: Monitoring/Evaluating progress  Goal: Verbalizes understanding of PCEA if epidural initiated  Description: Document on Patient Education Activity  Outcome: Monitoring/Evaluating progress  Goal: Verbalizes understanding of effective use of Nitrous Oxide  Description: Document on Patient Education Activity  Outcome: Monitoring/Evaluating progress     Problem: Pain (Non-Labor and/or Postpartum)  Goal: Acceptable pain/ comfort level is achieved/maintained at rest and with activity without oversedation (based on self-reporting using NRS / Faces)  Outcome: Monitoring/Evaluating progress  Goal: Verbalizes  Left message for patient to call back for PAT.   understanding of pain management  Description: Document on Patient Education Activity  Outcome: Monitoring/Evaluating progress  Goal: Verbalizes understanding of effective use of Patient Controlled Analgesia (PCA)  Description: Document on Patient Education Activity  Outcome: Monitoring/Evaluating progress     Problem: Postpartum  Goal: Demonstrates progression toward physical  / emotional / psychosocial postpartum recovery  Outcome: Monitoring/Evaluating progress  Goal: Demonstrates positive reciprocal attachment with infant  Outcome: Monitoring/Evaluating progress  Goal: Verbalizes understanding of normal physical and emotional alterations associated with puerperium  Description: Document on Patient Education Activity  Outcome: Monitoring/Evaluating progress  Goal: Begins to establish milk supply to meet personal breastfeeding goals  Outcome: Monitoring/Evaluating progress

## 2025-02-26 NOTE — PROGRESS NOTES
Spoke with patient's wife Dotty and patient will arrive at 0630 at [facility] on 3/6/2025 for his procedure at 0800.    NOTHING TO EAT OR DRINK AFTER MIDNIGHT DAY OF SURGERY    1. Enter thru the hospital main entrance on day of surgery, check in at the Information Desk. If you arrive prior to 6:00am, enter thru the ER entrance.    2. Follow the directions as prescribed by the doctor for your procedure and medications.         Morning of surgery take: metoprolol, pepcid and amlodipine.         Stop vitamins, supplements and NSAIDS:      3. Check with your Doctor regarding stopping blood thinners and follow their instructions.    4. Do not smoke, vape or use chewing tobacco morning of surgery. Do not drink any alcoholic beverages 24 hours prior to surgery.       This includes NA Beer. No street drugs 7 days prior to surgery.    5. If you have dentures, contacts of glasses they will be removed before going to the OR; please bring a case.    6. Please bring picture ID, insurance card, paperwork from the doctor’s office (H & P, Consent, & card for implantable devices).    7. Take a shower with an antibacterial soap the night before surgery and the morning of surgery. Do not put anything on your skin      After your morning shower.    8. You will need a responsible adult to drive you home and check on you after surgery.

## 2025-03-04 ENCOUNTER — ANESTHESIA EVENT (OUTPATIENT)
Dept: ENDOSCOPY | Age: 50
End: 2025-03-04
Payer: COMMERCIAL

## 2025-03-04 NOTE — ANESTHESIA PRE PROCEDURE
Component Value Date/Time    PROTIME 11.6 04/27/2023 03:24 PM    INR 0.91 04/27/2023 03:24 PM    APTT 32.2 04/27/2023 03:24 PM       HCG (If Applicable): No results found for: \"PREGTESTUR\", \"PREGSERUM\", \"HCG\", \"HCGQUANT\"     ABGs: No results found for: \"PHART\", \"PO2ART\", \"TLL0IFN\", \"SAR3KET\", \"BEART\", \"W6BKHVGP\"     Type & Screen (If Applicable):  No results found for: \"ABORH\", \"LABANTI\"    Drug/Infectious Status (If Applicable):  No results found for: \"HIV\", \"HEPCAB\"    COVID-19 Screening (If Applicable):   Lab Results   Component Value Date/Time    COVID19 neg 02/29/2024 12:00 AM           Anesthesia Evaluation  Patient summary reviewed  Airway:           Dental:          Pulmonary:                              Cardiovascular:    (+) hypertension:, hyperlipidemia         Beta Blocker:  Dose within 24 Hrs      ROS comment: Diastolic dysfunction  Stress Test 3/2023  Impression: Dilated cardiomyopathy - dilated LV (ESV 125mL) with systolic dysfunction  - EF 37%        ECHO 2/14/2020  1. LV systolic dysfunction EF 31%  2. Left ventricular dilated (6.2 cm)  3. Concentric left ventricular hypertrophy  4. LA normal size 3.5cm  5. RV systolic function normal. Pulmonary artery pressure 300 mmHg  6. Mild to moderate mitral regurgitation. Mild aortic regurgitation  7. No pericardial effusion  8. Diastolic dysfunction (impaired relaxation)     Neuro/Psych:                ROS comment: Hx alcohol abuse GI/Hepatic/Renal:   (+) GERD:, liver disease:, bowel prep, morbid obesity         ROS comment: Fatty liver.   Endo/Other:    (+) blood dyscrasia: anemia:..                 Abdominal:             Vascular:          Other Findings:             Anesthesia Plan      MAC     ASA 3     (Chart Review)  Induction: intravenous.                            CARLTON Swift - CRNA   3/4/2025

## 2025-03-05 NOTE — H&P
Skin color, texture, turgor normal, no rashes or lesions   Neurologic:   No focal deficits, moving all four extremities      ASA Grade:  ASA 3 - Patient with moderate systemic disease with functional limitations    Prior Anesthesia complications: None.

## 2025-03-05 NOTE — PROGRESS NOTES
Left message on patient's wife Dotty's phone for patient to arrive at 0630 at [facility] on 3/6/2025 for his procedure at 0800.

## 2025-03-06 ENCOUNTER — HOSPITAL ENCOUNTER (OUTPATIENT)
Age: 50
Setting detail: OUTPATIENT SURGERY
Discharge: HOME | End: 2025-03-06
Attending: INTERNAL MEDICINE | Admitting: INTERNAL MEDICINE
Payer: COMMERCIAL

## 2025-03-06 ENCOUNTER — ANESTHESIA (OUTPATIENT)
Dept: ENDOSCOPY | Age: 50
End: 2025-03-06
Payer: COMMERCIAL

## 2025-03-06 VITALS
TEMPERATURE: 97 F | SYSTOLIC BLOOD PRESSURE: 135 MMHG | WEIGHT: 308 LBS | HEIGHT: 72 IN | DIASTOLIC BLOOD PRESSURE: 87 MMHG | RESPIRATION RATE: 18 BRPM | BODY MASS INDEX: 41.72 KG/M2 | OXYGEN SATURATION: 97 % | HEART RATE: 79 BPM

## 2025-03-06 DIAGNOSIS — R19.5 POSITIVE FIT (FECAL IMMUNOCHEMICAL TEST): ICD-10-CM

## 2025-03-06 DIAGNOSIS — I10 ESSENTIAL HYPERTENSION, MALIGNANT: ICD-10-CM

## 2025-03-06 PROCEDURE — 88305 TISSUE EXAM BY PATHOLOGIST: CPT | Performed by: PATHOLOGY

## 2025-03-06 PROCEDURE — 7100000010 HC PHASE II RECOVERY - FIRST 15 MIN: Performed by: INTERNAL MEDICINE

## 2025-03-06 PROCEDURE — 3700000000 HC ANESTHESIA ATTENDED CARE: Performed by: INTERNAL MEDICINE

## 2025-03-06 PROCEDURE — 2709999900 HC NON-CHARGEABLE SUPPLY: Performed by: INTERNAL MEDICINE

## 2025-03-06 PROCEDURE — 3609010600 HC COLONOSCOPY POLYPECTOMY SNARE/COLD BIOPSY: Performed by: INTERNAL MEDICINE

## 2025-03-06 PROCEDURE — 2580000003 HC RX 258

## 2025-03-06 PROCEDURE — 6360000002 HC RX W HCPCS

## 2025-03-06 PROCEDURE — 7100000011 HC PHASE II RECOVERY - ADDTL 15 MIN: Performed by: INTERNAL MEDICINE

## 2025-03-06 PROCEDURE — 3700000001 HC ADD 15 MINUTES (ANESTHESIA): Performed by: INTERNAL MEDICINE

## 2025-03-06 RX ORDER — LIDOCAINE HYDROCHLORIDE 20 MG/ML
INJECTION, SOLUTION EPIDURAL; INFILTRATION; INTRACAUDAL; PERINEURAL
Status: DISCONTINUED | OUTPATIENT
Start: 2025-03-06 | End: 2025-03-06 | Stop reason: SDUPTHER

## 2025-03-06 RX ORDER — SODIUM CHLORIDE, SODIUM LACTATE, POTASSIUM CHLORIDE, CALCIUM CHLORIDE 600; 310; 30; 20 MG/100ML; MG/100ML; MG/100ML; MG/100ML
INJECTION, SOLUTION INTRAVENOUS CONTINUOUS
Status: DISCONTINUED | OUTPATIENT
Start: 2025-03-06 | End: 2025-03-06 | Stop reason: HOSPADM

## 2025-03-06 RX ORDER — SODIUM CHLORIDE, SODIUM LACTATE, POTASSIUM CHLORIDE, CALCIUM CHLORIDE 600; 310; 30; 20 MG/100ML; MG/100ML; MG/100ML; MG/100ML
INJECTION, SOLUTION INTRAVENOUS
Status: DISCONTINUED | OUTPATIENT
Start: 2025-03-06 | End: 2025-03-06 | Stop reason: SDUPTHER

## 2025-03-06 RX ORDER — PROPOFOL 10 MG/ML
INJECTION, EMULSION INTRAVENOUS
Status: DISCONTINUED | OUTPATIENT
Start: 2025-03-06 | End: 2025-03-06 | Stop reason: SDUPTHER

## 2025-03-06 RX ADMIN — PROPOFOL 420 MG: 10 INJECTION, EMULSION INTRAVENOUS at 08:07

## 2025-03-06 RX ADMIN — LIDOCAINE HYDROCHLORIDE 50 MG: 20 INJECTION, SOLUTION EPIDURAL; INFILTRATION; INTRACAUDAL; PERINEURAL at 08:07

## 2025-03-06 RX ADMIN — SODIUM CHLORIDE, POTASSIUM CHLORIDE, SODIUM LACTATE AND CALCIUM CHLORIDE: 600; 310; 30; 20 INJECTION, SOLUTION INTRAVENOUS at 08:00

## 2025-03-06 ASSESSMENT — PAIN - FUNCTIONAL ASSESSMENT
PAIN_FUNCTIONAL_ASSESSMENT: 0-10

## 2025-03-06 NOTE — DISCHARGE INSTRUCTIONS
drive or operate machinery for 24 hours.  Do not sign legal documents or make major decisions for 24 hours. The anesthesia can make it hard for you to fully understand what you are agreeing to.      Follow-up care is a key part of your treatment and safety. Be sure to make and go to all appointments, and call your doctor if you are having problems. It's also a good idea to know your test results and keep a list of the medicines you take.  When should you call for help?  [facility] -[phone] OR [facility] [phone]  Call 911 anytime you think you may need emergency care. For example, call if:  You passed out (lost consciousness).  You pass maroon or bloody stools.  You have severe belly pain.  Call your doctor now or seek immediate medical care if:  Your stools are black and tarlike.  Your stools have streaks of blood, but you did not have a biopsy or any polyps removed.  You have belly pain, or your belly is swollen and firm.  You vomit.  You have a fever.  You are very dizzy.  Watch closely for changes in your health, and be sure to contact your doctor if you have any problems.  Where can you learn more?  Go to [email or web address] and sign in to your [employer] account. Enter E264 in the Search Health Information box to learn more about “Colonoscopy: What to Expect at Home.”    If you do not have an account, please click on the “Sign Up Now” link.  © 8849-5744 [employer]. Care instructions adapted under license by [employer]. This care instruction is for use with your licensed healthcare professional. If you have questions about a medical condition or this instruction, always ask your healthcare professional. [employer] disclaims any warranty or liability for your use of this information.  Content Version: 10.9.972558; Current as of: November 20, 2015

## 2025-03-06 NOTE — OP NOTE
[facility]    Procedure Note    3/6/2025  11:37 AM    Patient:    Elia Styles  : 1975   49 y.o.             MRN: [id number]  Admitted: 3/6/2025  6:21 AM ATT: No att. providers found   ENDO/NONE  AdmitDx: Positive FIT (fecal immunochemical test) [R19.5]  Essential hypertension, malignant [I10]  PCP: Greta Avila MD    Procedure:     Colonoscopy polypectomy    Date:  3/6/2025     Surgeon:  Alan Govea MD     Referring Physician:  ATT: No att. providers found, PCP: Greta Avila MD    Preoperative Diagnosis:  +FIT    Postoperative Diagnosis:  Colon polylp, Internal hemorrhoids    Anesthesia:  MAC Sedation (Deep Anesthesia)    Indications: This is a 49 y.o. year old male who presents today with indications as above.    The patient tolerated the procedure well and was taken to the post anesthesia care unit in good condition.    Complications: None  Estimated Blood Loss: <5cc  Specimens: biopsies were obtained  _______________________________________________________________________________  Colonoscopy: 25  Impression:         -  Preparation of the colon was fair.          -  One 6 mm polyp in the sigmoid colon, removed with a cold snare.             Resected and retrieved.          -  Two 4 to 4 mm polyps in the rectum.          -  Internal hemorrhoids.          -  The examined portion of the ileum was normal.   Recommendation:         -  Await pathology results.          -  Repeat colonoscopy in 7 years for surveillance based on pathology             results.          -  Return to GI clinic in 2 weeks.          -  The findings and recommendations were discussed with the patient             and their family.   _______________________________________________________________________________    Alan Govea  [facility]

## 2025-03-06 NOTE — ANESTHESIA POSTPROCEDURE EVALUATION
Department of Anesthesiology  Postprocedure Note    Patient: Elia Styles  MRN: [id number]  YOB: 1975  Date of evaluation: 3/6/2025    Procedure Summary       Date: 03/06/25 Room / Location: [address]    Anesthesia Start: 0800 Anesthesia Stop: 0829    Procedure: COLONOSCOPY POLYPECTOMY SNARE/BIOPSY Diagnosis:       Positive FIT (fecal immunochemical test)      Essential hypertension, malignant      (Positive FIT (fecal immunochemical test) [R19.5])      (Essential hypertension, malignant [I10])    Surgeons: Alan Govea MD Responsible Provider: Baylee Dixon MD    Anesthesia Type: MAC ASA Status: 3            Anesthesia Type: No value filed.    Yessenia Phase I: Yessenia Score: 10    Yessenia Phase II:      Anesthesia Post Evaluation    Patient location during evaluation: PACU  Patient participation: complete - patient participated  Level of consciousness: awake and alert  Airway patency: patent  Nausea & Vomiting: no nausea and no vomiting  Cardiovascular status: hemodynamically stable and blood pressure returned to baseline  Respiratory status: spontaneous ventilation and room air  Hydration status: euvolemic  Pain management: adequate    No notable events documented.

## 2025-03-06 NOTE — PROGRESS NOTES
0835: Patient returns to [facility] following procedure, bedside report received from Tammie COPE, VSS, family at bedside, call light in reach, beverage of choice provided.   0857: VSS, Discharge instructions reviewed with patient and daughter, Pawel, both verbalized understanding.   0900: IV removed, Patient dressing, call light in reach  0910: Patient taken via wheelchair to DC to car w family.

## 2025-03-06 NOTE — ANESTHESIA PRE PROCEDURE
breath sounds clear to auscultation      (-) COPD and asthma                           Cardiovascular:    (+) hypertension:, hyperlipidemia        Rhythm: regular  Rate: normal           Beta Blocker:  Dose within 24 Hrs      ROS comment: Diastolic dysfunction  Stress Test 3/2023  Impression: Dilated cardiomyopathy - dilated LV (ESV 125mL) with systolic dysfunction  - EF 37%        ECHO 2/14/2020  1. LV systolic dysfunction EF 31%  2. Left ventricular dilated (6.2 cm)  3. Concentric left ventricular hypertrophy  4. LA normal size 3.5cm  5. RV systolic function normal. Pulmonary artery pressure 300 mmHg  6. Mild to moderate mitral regurgitation. Mild aortic regurgitation  7. No pericardial effusion  8. Diastolic dysfunction (impaired relaxation)     Neuro/Psych:                ROS comment: Hx alcohol abuse GI/Hepatic/Renal:   (+) GERD:, liver disease:, bowel prep, morbid obesity         ROS comment: Fatty liver.   Endo/Other:    (+) blood dyscrasia: anemia:..                 Abdominal:             Vascular:          Other Findings:             Anesthesia Plan      MAC     ASA 3     (Chart Review)  Induction: intravenous.      Anesthetic plan and risks discussed with patient.      Plan discussed with CRNA.    Attending anesthesiologist reviewed and agrees with Preprocedure content                Naif Bahena MD   3/6/2025

## 2025-03-07 LAB — SURGICAL PATHOLOGY REPORT: NORMAL

## 2025-04-10 ENCOUNTER — OFFICE VISIT (OUTPATIENT)
Dept: FAMILY MEDICINE CLINIC | Age: 50
End: 2025-04-10
Payer: COMMERCIAL

## 2025-04-10 VITALS
BODY MASS INDEX: 41.12 KG/M2 | WEIGHT: 303.2 LBS | HEART RATE: 74 BPM | OXYGEN SATURATION: 97 % | SYSTOLIC BLOOD PRESSURE: 122 MMHG | DIASTOLIC BLOOD PRESSURE: 70 MMHG

## 2025-04-10 DIAGNOSIS — Z23 NEED FOR COVID-19 VACCINE: ICD-10-CM

## 2025-04-10 DIAGNOSIS — Z23 NEED FOR PNEUMOCOCCAL VACCINE: ICD-10-CM

## 2025-04-10 DIAGNOSIS — E78.1 HIGH TRIGLYCERIDES: ICD-10-CM

## 2025-04-10 DIAGNOSIS — F10.10 ALCOHOL ABUSE: ICD-10-CM

## 2025-04-10 DIAGNOSIS — K21.9 GASTROESOPHAGEAL REFLUX DISEASE WITHOUT ESOPHAGITIS: ICD-10-CM

## 2025-04-10 DIAGNOSIS — Z23 NEED FOR SHINGLES VACCINE: ICD-10-CM

## 2025-04-10 DIAGNOSIS — I10 ESSENTIAL HYPERTENSION: Primary | ICD-10-CM

## 2025-04-10 DIAGNOSIS — Z23 NEED FOR HEPATITIS B VACCINATION: ICD-10-CM

## 2025-04-10 DIAGNOSIS — E53.8 B12 DEFICIENCY: ICD-10-CM

## 2025-04-10 DIAGNOSIS — I51.89 DIASTOLIC DYSFUNCTION: ICD-10-CM

## 2025-04-10 DIAGNOSIS — M54.32 LEFT SIDED SCIATICA: ICD-10-CM

## 2025-04-10 PROCEDURE — 3017F COLORECTAL CA SCREEN DOC REV: CPT | Performed by: FAMILY MEDICINE

## 2025-04-10 PROCEDURE — 3078F DIAST BP <80 MM HG: CPT | Performed by: FAMILY MEDICINE

## 2025-04-10 PROCEDURE — 1036F TOBACCO NON-USER: CPT | Performed by: FAMILY MEDICINE

## 2025-04-10 PROCEDURE — G8417 CALC BMI ABV UP PARAM F/U: HCPCS | Performed by: FAMILY MEDICINE

## 2025-04-10 PROCEDURE — 90677 PCV20 VACCINE IM: CPT | Performed by: FAMILY MEDICINE

## 2025-04-10 PROCEDURE — 90746 HEPB VACCINE 3 DOSE ADULT IM: CPT | Performed by: FAMILY MEDICINE

## 2025-04-10 PROCEDURE — 90471 IMMUNIZATION ADMIN: CPT | Performed by: FAMILY MEDICINE

## 2025-04-10 PROCEDURE — 90472 IMMUNIZATION ADMIN EACH ADD: CPT | Performed by: FAMILY MEDICINE

## 2025-04-10 PROCEDURE — 3074F SYST BP LT 130 MM HG: CPT | Performed by: FAMILY MEDICINE

## 2025-04-10 PROCEDURE — 99214 OFFICE O/P EST MOD 30 MIN: CPT | Performed by: FAMILY MEDICINE

## 2025-04-10 PROCEDURE — G8427 DOCREV CUR MEDS BY ELIG CLIN: HCPCS | Performed by: FAMILY MEDICINE

## 2025-04-10 RX ORDER — ATORVASTATIN CALCIUM 80 MG/1
80 TABLET, FILM COATED ORAL DAILY
Qty: 90 TABLET | Refills: 1 | Status: SHIPPED | OUTPATIENT
Start: 2025-04-10

## 2025-04-10 RX ORDER — SPIRONOLACTONE 25 MG/1
25 TABLET ORAL DAILY
COMMUNITY

## 2025-04-10 RX ORDER — AMLODIPINE BESYLATE 10 MG/1
10 TABLET ORAL DAILY
Qty: 90 TABLET | Refills: 1 | Status: SHIPPED | OUTPATIENT
Start: 2025-04-10

## 2025-04-10 RX ORDER — FAMOTIDINE 40 MG/1
40 TABLET, FILM COATED ORAL 2 TIMES DAILY
Qty: 180 TABLET | Refills: 1 | Status: SHIPPED | OUTPATIENT
Start: 2025-04-10

## 2025-04-10 SDOH — ECONOMIC STABILITY: FOOD INSECURITY: WITHIN THE PAST 12 MONTHS, THE FOOD YOU BOUGHT JUST DIDN'T LAST AND YOU DIDN'T HAVE MONEY TO GET MORE.: NEVER TRUE

## 2025-04-10 SDOH — ECONOMIC STABILITY: FOOD INSECURITY: WITHIN THE PAST 12 MONTHS, YOU WORRIED THAT YOUR FOOD WOULD RUN OUT BEFORE YOU GOT MONEY TO BUY MORE.: NEVER TRUE

## 2025-04-10 ASSESSMENT — ENCOUNTER SYMPTOMS
SHORTNESS OF BREATH: 0
COUGH: 0

## 2025-04-10 ASSESSMENT — PATIENT HEALTH QUESTIONNAIRE - PHQ9
2. FEELING DOWN, DEPRESSED OR HOPELESS: NOT AT ALL
1. LITTLE INTEREST OR PLEASURE IN DOING THINGS: NOT AT ALL
SUM OF ALL RESPONSES TO PHQ QUESTIONS 1-9: 0

## 2025-04-10 NOTE — PATIENT INSTRUCTIONS
push the towel back into place.  Repeat 8 to 12 times.  It's a good idea to repeat these steps with your other foot.  Make this exercise more challenging by placing a weighted object, such as a soup can, on the other end of the towel.  Irving pickups

## 2025-04-10 NOTE — PROGRESS NOTES
Patient ID: Elia BRITTON Stephani 1975    .  Chief Complaint   Patient presents with    Hypertension    Hyperlipidemia    hepatitis b vacc         Hypertension  This is a recurrent problem. The current episode started more than 1 month ago. The problem is controlled. Pertinent negatives include no shortness of breath. Past treatments include calcium channel blockers. The current treatment provides significant improvement. Compliance problems include psychosocial issues and diet.    Hyperlipidemia  This is a chronic problem. The current episode started more than 1 year ago. The problem is uncontrolled. Pertinent negatives include no shortness of breath. Current antihyperlipidemic treatment includes statins. Compliance problems include adherence to exercise.    Gastroesophageal Reflux  He reports no coughing (no unusual). consumes wine.  not interested in treatment. He has tried a histamine-2 antagonist for the symptoms.   Edema  Pertinent negatives include no coughing (no unusual).   Alcohol Problem  Primary symptoms comment: consumes wine.  not interested in treatment. Suspected agents: 1 bottle of wine per day.   Muscle Pain  This is a recurrent problem. Pain location: leg. Pertinent negatives include no shortness of breath. Treatments tried: zanaflex.         Patient Active Problem List   Diagnosis    Allergic rhinitis    Bilateral varicoceles    Erectile dysfunction    Essential hypertension    Iron deficiency anemia    B12 deficiency    Low testosterone level in male    High triglycerides    Fatty liver    Alcohol abuse    Mild concentric left ventricular hypertrophy (LVH)    Diastolic dysfunction    Adult BMI 40.0-44.9 kg/sq m    Positive FIT (fecal immunochemical test)         Current Outpatient Medications on File Prior to Visit   Medication Sig Dispense Refill    spironolactone (ALDACTONE) 25 MG tablet Take 1 tablet by mouth daily      sildenafil (VIAGRA) 100 MG tablet Take 1 tablet by mouth as needed

## 2025-08-13 ENCOUNTER — OFFICE VISIT (OUTPATIENT)
Dept: FAMILY MEDICINE CLINIC | Age: 50
End: 2025-08-13
Payer: COMMERCIAL

## 2025-08-13 VITALS
HEART RATE: 67 BPM | BODY MASS INDEX: 41.12 KG/M2 | WEIGHT: 303.2 LBS | DIASTOLIC BLOOD PRESSURE: 82 MMHG | TEMPERATURE: 98.2 F | OXYGEN SATURATION: 97 % | SYSTOLIC BLOOD PRESSURE: 122 MMHG

## 2025-08-13 DIAGNOSIS — R22.0 FACIAL SWELLING: ICD-10-CM

## 2025-08-13 DIAGNOSIS — R59.9 SWOLLEN LYMPH NODES: Primary | ICD-10-CM

## 2025-08-13 DIAGNOSIS — H60.501 ACUTE OTITIS EXTERNA OF RIGHT EAR, UNSPECIFIED TYPE: ICD-10-CM

## 2025-08-13 PROCEDURE — G8427 DOCREV CUR MEDS BY ELIG CLIN: HCPCS | Performed by: FAMILY MEDICINE

## 2025-08-13 PROCEDURE — G8417 CALC BMI ABV UP PARAM F/U: HCPCS | Performed by: FAMILY MEDICINE

## 2025-08-13 PROCEDURE — 4130F TOPICAL PREP RX AOE: CPT | Performed by: FAMILY MEDICINE

## 2025-08-13 PROCEDURE — 99214 OFFICE O/P EST MOD 30 MIN: CPT | Performed by: FAMILY MEDICINE

## 2025-08-13 PROCEDURE — 1036F TOBACCO NON-USER: CPT | Performed by: FAMILY MEDICINE

## 2025-08-13 PROCEDURE — 3074F SYST BP LT 130 MM HG: CPT | Performed by: FAMILY MEDICINE

## 2025-08-13 PROCEDURE — 3017F COLORECTAL CA SCREEN DOC REV: CPT | Performed by: FAMILY MEDICINE

## 2025-08-13 PROCEDURE — 3079F DIAST BP 80-89 MM HG: CPT | Performed by: FAMILY MEDICINE

## (undated) DEVICE — ENDOSCOPIC KIT 1.1+ OP4 CA DE 2 GWN AAMI LEVEL 3

## (undated) DEVICE — SNARE ENDOSCP CLD 230 CM 10 MM 2.3 MM ROTATABLE LESIONHUNTER